# Patient Record
Sex: MALE | Race: WHITE | Employment: OTHER | URBAN - METROPOLITAN AREA
[De-identification: names, ages, dates, MRNs, and addresses within clinical notes are randomized per-mention and may not be internally consistent; named-entity substitution may affect disease eponyms.]

---

## 2017-04-12 ENCOUNTER — ALLSCRIPTS OFFICE VISIT (OUTPATIENT)
Dept: OTHER | Facility: OTHER | Age: 72
End: 2017-04-12

## 2017-04-12 DIAGNOSIS — N18.30 CHRONIC KIDNEY DISEASE, STAGE III (MODERATE) (HCC): ICD-10-CM

## 2017-10-16 ENCOUNTER — ALLSCRIPTS OFFICE VISIT (OUTPATIENT)
Dept: OTHER | Facility: OTHER | Age: 72
End: 2017-10-16

## 2017-10-16 DIAGNOSIS — N18.30 CHRONIC KIDNEY DISEASE, STAGE III (MODERATE) (HCC): ICD-10-CM

## 2017-10-17 NOTE — PROGRESS NOTES
Assessment  1  Stage III chronic kidney disease (585 3) (N18 3)   2  Diabetes mellitus type II, controlled (250 00) (E11 9)   3  CAD (coronary artery disease) (414 00) (I25 10)    Plan  Stage III chronic kidney disease    · (1) BASIC METABOLIC PROFILE; Status:Active; Requested [facility]:[id number];    Perform:[facility]; Due:16Oct2018; Ordered; For:Stage III chronic kidney disease; Ordered By:Mark Vela;   · (1) CBC/ PLT (NO DIFF); Status:Active; Requested UBD:[id number];    Perform:[facility]; Due:16Oct2018; Ordered; For:Stage III chronic kidney disease; Ordered By:Mark Vela;   · (1) PHOSPHORUS; Status:Active; Requested CUP:[id number];    Perform:[facility]; Due:16Oct2018; Ordered; For:Stage III chronic kidney disease; Ordered By:Mark Vela;   · (1) PTH N-TERMINAL (INTACT); Status:Active; Requested ZXE:[id number];    Perform:[facility]; Due:16Oct2018; Ordered; For:Stage III chronic kidney disease; Ordered By:Mark Vela;   · (1) URINALYSIS (will reflex a microscopy if leukocytes, occult blood, protein or nitrites are  not within normal limits); Status:Active; Requested QOX:[id number];    Perform:[facility]; Due:16Oct2018; Ordered; For:Stage III chronic kidney disease; Ordered By:Mark Vela;   · (1) URINE PROTEIN CREATININE RATIO; Status:Active; Requested OVU:[id number];    Perform:[facility]; Due:16Oct2018; Ordered; For:Stage III chronic kidney disease; Ordered By:Mark Vela;   · Follow-up visit in 6 months Evaluation and Treatment  Follow-up  Status: Complete -  Scheduling  Done: [id number] 11:16AM   Ordered; For: Stage III chronic kidney disease; Ordered By: Alley Lamb Performed:  Due: [id number]; Last Updated By: Cara Sparrow; 10/16/2017 11:16:06 AM    Discussion/Summary    CKD stage III: Stable  Due to diabetic nephropathy  type 2: Not very well control  we will see him back in 6 month     The patient was counseled regarding diagnostic results,-- instructions for management,-- risk factor reductions,-- prognosis  The patient has the current Goals: Keep kidney function stable  The patent has the current Barriers: None  Patient is able to Self-Care  Possible side effects of new medications were reviewed with the patient/guardian today  Indication for Services: CKD Stage 3  CKD Teaching includes Avoid nephrotoxic medication, dietician counseling and fluid management  Reason For Visit  Normal him in today for follow-up of stage III CKD because of diabetic nephropathy      History of Present Illness  He is doing quite well denies any complaint  Taking medicine regularly  His sugar is not well controlled and he is aware of it      Review of Systems    Constitutional: No complaints of fever, no chills, no anorexia, no tiredness, no recent weight gain or weight loss  Integumentary: No complaints of skin rash  Gastrointestinal: No complains of abdominal pain, no constipation or diarrhea, no nausea or vomiting  Respiratory: No complaints of shortness of breath, no cough, no productive sputum  Cardiovascular: No complaints of orthopnea, no PND, no chest pain, no palpitations, no lower extremity edema  Musculoskeletal: No complaints of joint pain or swelling  Neurological: No complaints of headache, no lightheadedness or dizziness  Genitourinary: No dysuria, no hematuria, no nocturia, no urinary frequency, no incomplete emptying of bladder, no foamy urine  ROS reviewed  Past Medical History    The active problems and past medical history were reviewed and updated today  Current Meds   1  Allopurinol 300 MG Oral Tablet; TAKE 1 TABLET DAILY; Therapy: (Recorded:07Nov2016) to Recorded   2  Aspirin 325 MG Oral Tablet; Take 1 tablet twice daily; Therapy: (Recorded:12Apr2017) to Recorded   3  Dicyclomine HCl - 20 MG Oral Tablet; Take 1 tablet daily; Therapy: (Recorded:12Apr2017) to Recorded   4   Fish Oil 1000 MG Oral Capsule; TAKE 6 CAPSULE Daily; Therapy: (Recorded:12Apr2017) to Recorded   5  Glimepiride 1 MG Oral Tablet; TAKE 1 TABLET 3 times daily; Therapy: (Recorded:12Apr2017) to Recorded   6  Isosorbide Mononitrate 10 MG Oral Tablet; Take 1 tablet daily; Therapy: (Recorded:07Nov2016) to Recorded   7  Januvia 50 MG Oral Tablet; Therapy: [id number] to Recorded   8  Lisinopril 5 MG Oral Tablet; Take 1 tablet daily; Therapy: (Recorded:12Apr2017) to Recorded   9  Metoprolol Tartrate 50 MG Oral Tablet; Take 1 tablet twice daily; Therapy: (Recorded:07Nov2016) to Recorded   10  Vitamin B-6 100 MG Oral Tablet; Take 1 tablet daily; Therapy: (Recorded:12Apr2017) to Recorded   11  Zantac CAPS; take 1 capsule daily; Therapy: (Recorded:12Apr2017) to Recorded    The medication list was reviewed and updated today  Allergies  1  No Known Drug Allergies    Vitals  Vital Signs    Recorded: [id number] 11:09AM Recorded: 27LXZ0475 10:44AM   Temperature  97 8 F   Heart Rate 80, Apical    Pulse Quality Normal, Apical    Respiration Quality Normal    Respiration 16    Systolic 701, LUE, Sitting    Diastolic 70, LUE, Sitting    Weight  212 lb    BMI Calculated  28 75   BSA Calculated  2 18     Physical Exam    Constitutional: General appearance: No acute distress, well appearing and well nourished  ENT: External ears and nose appear normal      Eyes: Anicteric sclerae  Neck: No bruit heard over either carotid  JVD:  No JVD present  Pulmonary: Respiratory effort: No increased work of breathing or signs of respiratory distress  -- Auscultation of lungs: Clear to auscultation  Cardiovascular: Auscultation of heart: Normal rate and rhythm, normal S1 and S2, without murmurs  Abdomen: Non-tender, no masses  Extremities: No cyanosis, clubbing or edema     Neurologic: Non Focal      Psychiatric: Orientation to person, place, and time: Normal        Results/Data  Nephrology Flowsheet [id number] 11:34AM Alley Hyatt Name Result Flag Reference   WBC, Urine 3       Nephrology Flowsheet [id number] 10:11AM Ely Epps     Test Name Result Flag Reference   WBC 7 3     Hemoglobin 15 9     Hematocrit 47 0     Platelets 895     Sodium 138     Potassium 4 1     Chloride 101     Carbon Dioxide 26     Calcium 10 0     GLUCOSE 169 H    BUN 25     Serum Creatinine 1 68 H    GFR, NON-AFRICAN AMERICAN 43     Phosphorus 3 3     PTH 50     Urine Protein Creatinine Ratio 0 12 H    Urinalysis Date 10/11/2017     SPECIFIC GRAVITY UA 1 024     BLOOD UA neg     PH UA 5 0     PROTEIN UA 1+     NITRITE UA neg     LEUKOCYTE ESTERASE UA trac     WBC, Urine 0     RBC 1     SQUAMOUS EPITHELIAL CELLS occasional     BACTERIA none     Glucose, Urine 1+         Future Appointments    Date/Time Provider Specialty Site   04/19/2018 11:00 AM ELOISA Sosa   Nephrology [address]     Signatures   Electronically signed by : ELOISA Rangel ; Oct 16 2017 12:06PM EST                       (Author)

## 2018-01-13 VITALS
DIASTOLIC BLOOD PRESSURE: 70 MMHG | HEIGHT: 72 IN | HEART RATE: 80 BPM | RESPIRATION RATE: 16 BRPM | SYSTOLIC BLOOD PRESSURE: 120 MMHG | BODY MASS INDEX: 28.44 KG/M2 | TEMPERATURE: 97.4 F | WEIGHT: 210 LBS

## 2018-01-14 VITALS
HEART RATE: 80 BPM | RESPIRATION RATE: 16 BRPM | WEIGHT: 212 LBS | BODY MASS INDEX: 28.75 KG/M2 | DIASTOLIC BLOOD PRESSURE: 70 MMHG | SYSTOLIC BLOOD PRESSURE: 110 MMHG | TEMPERATURE: 97.8 F

## 2018-04-30 RX ORDER — ISOSORBIDE MONONITRATE 10 MG/1
1 TABLET ORAL DAILY
COMMUNITY

## 2018-04-30 RX ORDER — ALLOPURINOL 300 MG/1
1 TABLET ORAL DAILY
COMMUNITY

## 2018-04-30 RX ORDER — METOPROLOL TARTRATE 50 MG/1
1 TABLET, FILM COATED ORAL 2 TIMES DAILY
COMMUNITY

## 2018-04-30 RX ORDER — FENOFIBRATE 48 MG/1
48 TABLET, COATED ORAL DAILY
Refills: 3 | COMMUNITY
Start: 2018-03-25

## 2018-04-30 RX ORDER — MULTIVITAMIN WITH IRON
1 TABLET ORAL DAILY
COMMUNITY

## 2018-04-30 RX ORDER — DICYCLOMINE HCL 20 MG
1 TABLET ORAL DAILY
COMMUNITY

## 2018-04-30 RX ORDER — GLIMEPIRIDE 1 MG/1
1 TABLET ORAL 3 TIMES DAILY
COMMUNITY

## 2018-04-30 RX ORDER — RANITIDINE 150 MG/1
1 CAPSULE ORAL DAILY
COMMUNITY

## 2018-04-30 RX ORDER — ASPIRIN 325 MG
1 TABLET ORAL 2 TIMES DAILY
COMMUNITY

## 2018-04-30 RX ORDER — LOSARTAN POTASSIUM 25 MG/1
25 TABLET ORAL DAILY
Refills: 3 | COMMUNITY
Start: 2018-03-15

## 2018-04-30 RX ORDER — LISINOPRIL 5 MG/1
1 TABLET ORAL DAILY
COMMUNITY

## 2018-04-30 RX ORDER — CHLORAL HYDRATE 500 MG
6 CAPSULE ORAL DAILY
COMMUNITY

## 2018-04-30 RX ORDER — REPAGLINIDE 1 MG/1
TABLET ORAL
Refills: 5 | COMMUNITY
Start: 2018-02-26

## 2018-04-30 RX ORDER — LEVOTHYROXINE SODIUM 0.03 MG/1
25 TABLET ORAL DAILY
Refills: 3 | COMMUNITY
Start: 2018-03-15

## 2018-04-30 RX ORDER — ATORVASTATIN CALCIUM 20 MG/1
20 TABLET, FILM COATED ORAL DAILY
Refills: 3 | COMMUNITY
Start: 2018-03-15

## 2018-05-01 LAB
ALT SERPL W P-5'-P-CCNC: 69 U/L (ref 12–78)
BUN SERPL-MCNC: 21 MG/DL (ref 5–25)
CHOLEST SERPL-MCNC: 185 MG/DL (ref 50–200)
CREAT ?TM UR-SCNC: 325 UMOL/L
CREAT SERPL-MCNC: 1.66 MG/DL (ref 0.6–1.3)
CREAT SERPL-MCNC: 1.76 MG/DL (ref 0.6–1.3)
EST. AVERAGE GLUCOSE BLD GHB EST-MCNC: 269 MG/DL
EXT BLOOD, UA: ABNORMAL
EXT GLUCOSE BLD: 220
EXT GLUCOSE BLD: 232
EXT GLUCOSE, UA: ABNORMAL
EXT KETONES: ABNORMAL
EXT NITRITE, UA: ABNORMAL
EXT PH, UA: 5
EXT PROTEIN URINE: 52.8
EXT PROTEIN, UA: ABNORMAL
EXT SPECIFIC GRAVITY, UA: 1.03
EXTERNAL ALBUMIN: 4.1
EXTERNAL ALK PHOS: 90
EXTERNAL ANION GAP: 9
EXTERNAL ANION GAP: 9
EXTERNAL AST: 51
EXTERNAL BICARBONATE: 26
EXTERNAL BUN: 22
EXTERNAL CALCIUM: 9.5
EXTERNAL CALCIUM: 9.6
EXTERNAL CHLORIDE: 102
EXTERNAL CHLORIDE: 102
EXTERNAL CO2: 26
EXTERNAL EGFR: 37
EXTERNAL EGFR: 40
EXTERNAL POTASSIUM: 4.4
EXTERNAL POTASSIUM: 4.5
EXTERNAL PTH: 81.9
EXTERNAL SODIUM: 137
EXTERNAL SODIUM: 137
EXTERNAL T.BILIRUBIN: 0.7
EXTERNAL TOTAL PROTEIN: 7.5
HBA1C MFR BLD: 11 % (ref 4.2–6.3)
HCT VFR BLD AUTO: 47.3 % (ref 36.5–49.3)
HCT VFR BLD AUTO: 48.2 % (ref 36.5–49.3)
HDLC SERPL-MCNC: 44 MG/DL (ref 40–60)
HGB BLD-MCNC: 16 G/DL (ref 12–17)
HGB BLD-MCNC: 16.2 G/DL (ref 12–17)
LDLC SERPL CALC-MCNC: 63 MG/DL (ref 0–100)
MICROALBUMIN UR-MCNC: 10.7 MG/L (ref 0–20)
PLATELET # BLD AUTO: 164 THOUSANDS/UL (ref 149–390)
PLATELET # BLD AUTO: 194 THOUSANDS/UL (ref 149–390)
PROT/CREAT UR: 0.16 MG/G{CREAT}
T4 FREE SERPL-MCNC: 0.96 NG/DL (ref 0.76–1.46)
TRIGL SERPL-MCNC: 392 MG/DL (ref ?–150)
TSH SERPL DL<=0.005 MIU/L-ACNC: 3.54 M[IU]/L
WBC # BLD AUTO: 7 THOUSAND/UL
WBC # BLD AUTO: 7.1 THOUSAND/UL
WBC # BLD EST: ABNORMAL 10*3/UL

## 2018-05-03 ENCOUNTER — OFFICE VISIT (OUTPATIENT)
Dept: NEPHROLOGY | Facility: CLINIC | Age: 73
End: 2018-05-03
Payer: MEDICARE

## 2018-05-03 VITALS — WEIGHT: 208 LBS | BODY MASS INDEX: 28.17 KG/M2 | HEIGHT: 72 IN | TEMPERATURE: 97.5 F

## 2018-05-03 DIAGNOSIS — I25.10 CORONARY ARTERY DISEASE INVOLVING NATIVE CORONARY ARTERY OF NATIVE HEART WITHOUT ANGINA PECTORIS: ICD-10-CM

## 2018-05-03 DIAGNOSIS — N18.30 STAGE III CHRONIC KIDNEY DISEASE (HCC): ICD-10-CM

## 2018-05-03 DIAGNOSIS — I10 ESSENTIAL HYPERTENSION: ICD-10-CM

## 2018-05-03 DIAGNOSIS — E11.21 TYPE 2 DIABETES MELLITUS WITH DIABETIC NEPHROPATHY, WITHOUT LONG-TERM CURRENT USE OF INSULIN (HCC): Primary | ICD-10-CM

## 2018-05-03 PROBLEM — E11.9 DM (DIABETES MELLITUS) (HCC): Status: ACTIVE | Noted: 2017-11-19

## 2018-05-03 PROCEDURE — 99213 OFFICE O/P EST LOW 20 MIN: CPT | Performed by: INTERNAL MEDICINE

## 2018-05-03 RX ORDER — LEVOCETIRIZINE DIHYDROCHLORIDE 5 MG/1
5 TABLET, FILM COATED ORAL
COMMUNITY
Start: 2016-02-23

## 2018-05-03 NOTE — ASSESSMENT & PLAN NOTE
Not very well controlled at told with hemoglobin A1cs almost well    He is going to see endocrinologist   I discussed uncontrolled diabetes any effect on kidney

## 2018-05-03 NOTE — ASSESSMENT & PLAN NOTE
His kidney function is stable at GFR of 40  Etiology is diabetic nephropathy    I discussed asymptomatic and progressive nature of kidney disease with him and advised to avoid any nephrotoxic medicine

## 2018-05-03 NOTE — LETTER
May 3, 2018     Bienvenido Tony DO  [address], [address] [facility] [address]    Patient: Jake Gonzalez   YOB: 1945   Date of Visit: 5/3/2018       Dear Dr Arevalo Levels: Thank you for referring Brittany Luciano to me for evaluation  Below are my notes for this consultation  If you have questions, please do not hesitate to call me  I look forward to following your patient along with you  Sincerely,        Tessa Beasley MD        CC: No Recipients  Tessa Beasley MD  5/3/2018 11:58 AM  Sign at close encounter  [address] [place] 75 y o  male MRN: [id number]    Encounter: [id number] 5/3/2018    REASON FOR VISIT: Jake Gonzalez is a 75 y o  male who is here on 5/3/2018 for CKD III; Follow-up; Hypertension; Hyperlipidemia; and Diabetes    HPI:    Patrice Meckel came in today for follow-up of stage III CKD  He is overall doing well denies any complaint  His diabetes is not well controlled and he is going to see endocrinologist   He is drinking enough liquid and avoiding any nonsteroidal pain killer        REVIEW OF SYSTEMS:    Review of Systems   Constitutional: Negative for activity change and fatigue  HENT: Negative for congestion and ear discharge  Eyes: Negative for photophobia and pain  Respiratory: Negative for apnea and choking  Cardiovascular: Negative for chest pain and palpitations  Gastrointestinal: Negative for abdominal distention and blood in stool  Endocrine: Negative for heat intolerance and polyphagia  Genitourinary: Negative for flank pain and urgency  Musculoskeletal: Negative for neck pain and neck stiffness  Skin: Negative for color change and wound  Allergic/Immunologic: Negative for food allergies and immunocompromised state  Neurological: Negative for seizures and facial asymmetry  Hematological: Negative for adenopathy  Does not bruise/bleed easily     Psychiatric/Behavioral: Negative for self-injury and suicidal ideas          PAST MEDICAL HISTORY:  Past Medical History:   Diagnosis Date    Chronic kidney disease     Coronary artery disease     Diabetes mellitus ([address] )     High cholesterol     Hypertension        PAST SURGICAL HISTORY:  Past Surgical History:   Procedure Laterality Date    CARDIAC SURGERY      CORONARY ARTERY BYPASS GRAFT      GALLBLADDER SURGERY      SHOULDER SURGERY      VESICOSTOMY         SOCIAL HISTORY:  History   Alcohol Use No     History   Drug use: Unknown     History   Smoking Status    Former Smoker   Smokeless Tobacco    Never Used       FAMILY HISTORY:  Family History   Problem Relation Age of Onset    Hypertension Mother     Diabetes Mother     Hypertension Father     Heart disease Father        MEDICATIONS:    Current Outpatient Prescriptions:     allopurinol (ZYLOPRIM) 300 mg tablet, Take 1 tablet by mouth daily, Disp: , Rfl:     APPLE CIDER VINEGAR PO, Take 350 mg by mouth 2 (two) times a day, Disp: , Rfl:     aspirin 325 mg tablet, Take 1 tablet by mouth 2 (two) times a day, Disp: , Rfl:     atorvastatin (LIPITOR) 20 mg tablet, Take 20 mg by mouth daily, Disp: , Rfl: 3    fenofibrate (TRICOR) 48 mg tablet, Take 48 mg by mouth daily, Disp: , Rfl: 3    glimepiride (AMARYL) 1 mg tablet, Take 1 tablet by mouth 3 (three) times a day, Disp: , Rfl:     isosorbide mononitrate (ISMO,MONOKET) 10 MG tablet, Take 1 tablet by mouth daily, Disp: , Rfl:     levocetirizine (XYZAL) 5 MG tablet, Take 5 mg by mouth, Disp: , Rfl:     levothyroxine 25 mcg tablet, Take 25 mcg by mouth daily, Disp: , Rfl: 3    losartan (COZAAR) 25 mg tablet, Take 25 mg by mouth daily, Disp: , Rfl: 3    metoprolol tartrate (LOPRESSOR) 50 mg tablet, Take 1 tablet by mouth 2 (two) times a day, Disp: , Rfl:     Omega-3 Fatty Acids (FISH OIL) 1,000 mg, Take 6 capsules by mouth daily, Disp: , Rfl:     pyridoxine (VITAMIN B6) 100 mg tablet, Take 1 tablet by mouth daily, Disp: , Rfl:     ranitidine (ZANTAC) 150 MG capsule, Take 1 capsule by mouth daily, Disp: , Rfl:     repaglinide (PRANDIN) 1 mg tablet, TAKE 1 TABLET BY MOUTH 3 TIMES A DAY (BEFORE MEALS), Disp: , Rfl: 5    dicyclomine (BENTYL) 20 mg tablet, Take 1 tablet by mouth daily, Disp: , Rfl:     lisinopril (ZESTRIL) 5 mg tablet, Take 1 tablet by mouth daily, Disp: , Rfl:     sitaGLIPtin (JANUVIA) 50 mg tablet, Take by mouth, Disp: , Rfl:     PHYSICAL EXAM:  Vitals:    05/03/18 0849   Temp: 97 5 °F (36 4 °C)   TempSrc: Oral   Weight: 94 3 kg (208 lb)   Height: 6' (1 829 m)     Body mass index is 28 21 kg/m²  Physical Exam   Constitutional: He is oriented to person, place, and time  He appears well-developed  No distress  HENT:   Head: Normocephalic  Mouth/Throat: Oropharynx is clear and moist    Eyes: Conjunctivae are normal  No scleral icterus  Neck: Neck supple  No JVD present  Cardiovascular: Normal rate and normal heart sounds  Pulmonary/Chest: Effort normal  He has no wheezes  Abdominal: Soft  There is no tenderness  Musculoskeletal: Normal range of motion  He exhibits no edema  Neurological: He is alert and oriented to person, place, and time  Skin: Skin is warm  No rash noted  Psychiatric: He has a normal mood and affect  His behavior is normal        LAB RESULTS:  Results for orders placed or performed in visit on 05/01/18   Comprehensive metabolic panel   Result Value Ref Range    EXTERNAL SODIUM 137     EXTERNAL POTASSIUM 4 4     EXTERNAL CHLORIDE 102     EXTERNAL BICARB 26     EXTERNAL ANION GAP 9     BUN 21 5 - 25 mg/dL    Creatinine 1 66 (A) 0 60 - 1 30 mg/dL    EXT Glucose Bld 232     EXTERNAL CALCIUM 9 5     EXTERNAL TOTAL PROTEIN 7 5     EXTERNAL ALBUMIN 4 1     EXTERNAL AST 51     ALT 69 12 - 78 U/L    EXTERNAL ALK PHOS 90     EXTERNAL TOT   BILIRUBIN 0 7     EXTERNAL EGFR 40    T4, free   Result Value Ref Range    Free T4 0 96 0 76 - 1 46 ng/dL   Hemoglobin A1C With EAG (Historical)   Result Value Ref Range Hemoglobin A1C 11 0 (A) 4 2 - 6 3 %     mg/dl   Lipid panel   Result Value Ref Range    Cholesterol 185 50 - 200 mg/dL    Triglycerides 392 (A) 150 mg/dL    Total HDL  Direct 44 40 - 60 mg/dL    LDL Calculated 63 0 - 100 mg/dL   Microalbumin,Urine   Result Value Ref Range    Microalb, Ur 10 7 0 - 20 mg/L   Urinalysis with microscopic   Result Value Ref Range    EXT Spec Grav, UA 1 028     EXT Glucose, UA (Ref: Negative) 150-499     EXT Ketones, UA (Ref: Negative) NEG     EXT Blood, UA NEG     EXT Nitrite, UA (Ref: Negative) NEG     EXT Leukocytes, UA (Ref: Negative) NEG     EXT pH, UA 5 0     EXT Protein, UA (Ref: Negative) 30-70    CBC   Result Value Ref Range    WBC 7 00 Thousand/uL    Hemoglobin 16 0 12 0 - 17 0 g/dL    Hematocrit 47 3 36 5 - 49 3 %    Platelets 440 375 - 787 Thousands/uL   Basic metabolic panel   Result Value Ref Range    EXTERNAL SODIUM 137     EXTERNAL POTASSIUM 4 5     EXTERNAL CHLORIDE 102     EXTERNAL CO2 26     EXTERNAL BUN 22     Creatinine 1 76 (A) 0 60 - 1 30 mg/dL    EXT Glucose Bld 220     EXTERNAL CALCIUM 9 6     EXTERNAL ANION GAP 9     EXTERNAL EGFR 37    Protein / creatinine ratio, urine   Result Value Ref Range    EXTERNAL PROTEIN URINE 52 8     EXT Creatinine Urine 325 0     EXTERNAL Ur Prot/Creat Ratio 0 16    PTH, intact   Result Value Ref Range    EXTERNAL PTH 81 9    CBC   Result Value Ref Range    WBC 7 10 Thousand/uL    Hemoglobin 16 2 12 0 - 17 0 g/dL    Hematocrit 48 2 36 5 - 49 3 %    Platelets 533 063 - 183 Thousands/uL   TSH Stimulation   Result Value Ref Range    TSH 3 54        ASSESSMENT and PLAN:      Stage III chronic kidney disease  His kidney function is stable at GFR of 40  Etiology is diabetic nephropathy  I discussed asymptomatic and progressive nature of kidney disease with him and advised to avoid any nephrotoxic medicine    DM (diabetes mellitus) ([facility] [address] )  Not very well controlled at told with hemoglobin A1cs almost well    He is going to see endocrinologist   I discussed uncontrolled diabetes any effect on kidney    Hypertension  Very well control        I will see him back in 6 months      Portions of the record may have been created with voice recognition software  Occasional wrong word or "sound a like" substitutions may have occurred due to the inherent limitations of voice recognition software  Read the chart carefully and recognize, using context, where substitutions have occurred  If you have any questions, please contact the dictating provider

## 2018-05-03 NOTE — PATIENT INSTRUCTIONS
Chronic Kidney Disease   AMBULATORY CARE:   Chronic kidney disease (CKD)  is the gradual and permanent loss of kidney function  Normally, the kidneys remove fluid, chemicals, and waste from your blood  These wastes are turned into urine by your kidneys  CKD may worsen over time and lead to kidney failure  Common symptoms include the following:   · Changes in how often you need to urinate    · Swelling in your arms, legs, or feet    · Shortness of breath    · Fatigue or weakness    · Bad or bitter taste in your mouth    · Nausea, vomiting, or loss of appetite  Seek care immediately if:   · You are confused and very drowsy  · You have a seizure  · You have shortness of breath  Contact your healthcare provider if:   · You suddenly gain or lose more weight than your healthcare provider has told you is okay  · You have itchy skin or a rash  · You urinate more or less than you normally do  · You have blood in your urine  · You have nausea and repeated vomiting  · You have fatigue or muscle weakness  · You have hiccups that will not stop  · You have questions or concerns about your condition or care  Treatment for CKD:  Medicines may be given to decrease blood pressure and get rid of extra fluid  You may also receive medicine to manage health conditions that may occur with CKD  Dialysis is a treatment to remove chemicals and waste from your blood when your kidneys can no longer do this  Surgery may be needed to create an arteriovenous fistula (AVF) in your arm or insert a catheter into your abdomen so that you can receive dialysis  A kidney transplant may be done if your CKD becomes severe  Manage CKD:   · Maintain a healthy weight  Ask your healthcare provider how much you should weigh  Ask him to help you create a weight loss plan if you are overweight  · Exercise 30 to 60 minutes a day, 4 to 7 times a week, or as directed  Ask about the best exercise plan for you   Regular exercise can help you manage CKD, high blood pressure, and diabetes  · Follow your healthcare provider's advice about what to eat and drink  He may tell you to eat food low in sodium (salt), potassium, phosphorus, or protein  You may need to see a dietitian if you need help planning meals  Ask how much liquid to drink each day and which liquids are best for you  · Limit alcohol  Ask how much alcohol is safe for you to drink  A drink of alcohol is 12 ounces of beer, 5 ounces of wine, or 1½ ounces of liquor  · Do not smoke  Nicotine and other chemicals in cigarettes and cigars can cause lung and kidney damage  Ask your healthcare provider for information if you currently smoke and need help to quit  E-cigarettes or smokeless tobacco still contain nicotine  Talk to your healthcare provider before you use these products  · Ask your healthcare provider if you need vaccines  Infections such as pneumonia, influenza, and hepatitis can be more harmful or more likely to occur in a person who has CKD  Vaccines reduce your risk of infection with these viruses  Follow up with your healthcare provider as directed:  Write down your questions so you remember to ask them during your visits  © 2017 [facility] Information is for End User's use only and may not be sold, redistributed or otherwise used for commercial purposes  All illustrations and images included in CareNotes® are the copyrighted property of [employer] D [employer] M [employer]  or [employer]  The above information is an  only  It is not intended as medical advice for individual conditions or treatments  Talk to your doctor, nurse or pharmacist before following any medical regimen to see if it is safe and effective for you

## 2018-05-03 NOTE — PROGRESS NOTES
NEPHROLOGY OFFICE FOLLOW UP  Sam Matias 75 y o  male MRN: [id number]    Encounter: [id number] 5/3/2018    REASON FOR VISIT: Tanvi Farias is a 75 y o  male who is here on 5/3/2018 for CKD III; Follow-up; Hypertension; Hyperlipidemia; and Diabetes    HPI:    Nilda Felix came in today for follow-up of stage III CKD  He is overall doing well denies any complaint  His diabetes is not well controlled and he is going to see endocrinologist   He is drinking enough liquid and avoiding any nonsteroidal pain killer        REVIEW OF SYSTEMS:    Review of Systems   Constitutional: Negative for activity change and fatigue  HENT: Negative for congestion and ear discharge  Eyes: Negative for photophobia and pain  Respiratory: Negative for apnea and choking  Cardiovascular: Negative for chest pain and palpitations  Gastrointestinal: Negative for abdominal distention and blood in stool  Endocrine: Negative for heat intolerance and polyphagia  Genitourinary: Negative for flank pain and urgency  Musculoskeletal: Negative for neck pain and neck stiffness  Skin: Negative for color change and wound  Allergic/Immunologic: Negative for food allergies and immunocompromised state  Neurological: Negative for seizures and facial asymmetry  Hematological: Negative for adenopathy  Does not bruise/bleed easily  Psychiatric/Behavioral: Negative for self-injury and suicidal ideas           PAST MEDICAL HISTORY:  Past Medical History:   Diagnosis Date    Chronic kidney disease     Coronary artery disease     Diabetes mellitus ([facility] [address] [facility]     High cholesterol     Hypertension        PAST SURGICAL HISTORY:  Past Surgical History:   Procedure Laterality Date    CARDIAC SURGERY      CORONARY ARTERY BYPASS GRAFT      GALLBLADDER SURGERY      SHOULDER SURGERY      VESICOSTOMY         SOCIAL HISTORY:  History   Alcohol Use No     History   Drug use: Unknown     History   Smoking Status    Former Smoker   Smokeless Tobacco    Never Used       FAMILY HISTORY:  Family History   Problem Relation Age of Onset    Hypertension Mother     Diabetes Mother     Hypertension Father     Heart disease Father        MEDICATIONS:    Current Outpatient Prescriptions:     allopurinol (ZYLOPRIM) 300 mg tablet, Take 1 tablet by mouth daily, Disp: , Rfl:     APPLE CIDER VINEGAR PO, Take 350 mg by mouth 2 (two) times a day, Disp: , Rfl:     aspirin 325 mg tablet, Take 1 tablet by mouth 2 (two) times a day, Disp: , Rfl:     atorvastatin (LIPITOR) 20 mg tablet, Take 20 mg by mouth daily, Disp: , Rfl: 3    fenofibrate (TRICOR) 48 mg tablet, Take 48 mg by mouth daily, Disp: , Rfl: 3    glimepiride (AMARYL) 1 mg tablet, Take 1 tablet by mouth 3 (three) times a day, Disp: , Rfl:     isosorbide mononitrate (ISMO,MONOKET) 10 MG tablet, Take 1 tablet by mouth daily, Disp: , Rfl:     levocetirizine (XYZAL) 5 MG tablet, Take 5 mg by mouth, Disp: , Rfl:     levothyroxine 25 mcg tablet, Take 25 mcg by mouth daily, Disp: , Rfl: 3    losartan (COZAAR) 25 mg tablet, Take 25 mg by mouth daily, Disp: , Rfl: 3    metoprolol tartrate (LOPRESSOR) 50 mg tablet, Take 1 tablet by mouth 2 (two) times a day, Disp: , Rfl:     Omega-3 Fatty Acids (FISH OIL) 1,000 mg, Take 6 capsules by mouth daily, Disp: , Rfl:     pyridoxine (VITAMIN B6) 100 mg tablet, Take 1 tablet by mouth daily, Disp: , Rfl:     ranitidine (ZANTAC) 150 MG capsule, Take 1 capsule by mouth daily, Disp: , Rfl:     repaglinide (PRANDIN) 1 mg tablet, TAKE 1 TABLET BY MOUTH 3 TIMES A DAY (BEFORE MEALS), Disp: , Rfl: 5    dicyclomine (BENTYL) 20 mg tablet, Take 1 tablet by mouth daily, Disp: , Rfl:     lisinopril (ZESTRIL) 5 mg tablet, Take 1 tablet by mouth daily, Disp: , Rfl:     sitaGLIPtin (JANUVIA) 50 mg tablet, Take by mouth, Disp: , Rfl:     PHYSICAL EXAM:  Vitals:    05/03/18 0849   Temp: 97 5 °F (36 4 °C)   TempSrc: Oral   Weight: 94 3 kg (208 lb)   Height: 6' (1 829 m)     Body mass index is 28 21 kg/m²  Physical Exam   Constitutional: He is oriented to person, place, and time  He appears well-developed  No distress  HENT:   Head: Normocephalic  Mouth/Throat: Oropharynx is clear and moist    Eyes: Conjunctivae are normal  No scleral icterus  Neck: Neck supple  No JVD present  Cardiovascular: Normal rate and normal heart sounds  Pulmonary/Chest: Effort normal  He has no wheezes  Abdominal: Soft  There is no tenderness  Musculoskeletal: Normal range of motion  He exhibits no edema  Neurological: He is alert and oriented to person, place, and time  Skin: Skin is warm  No rash noted  Psychiatric: He has a normal mood and affect  His behavior is normal        LAB RESULTS:  Results for orders placed or performed in visit on 05/01/18   Comprehensive metabolic panel   Result Value Ref Range    EXTERNAL SODIUM 137     EXTERNAL POTASSIUM 4 4     EXTERNAL CHLORIDE 102     EXTERNAL BICARB 26     EXTERNAL ANION GAP 9     BUN 21 5 - 25 mg/dL    Creatinine 1 66 (A) 0 60 - 1 30 mg/dL    EXT Glucose Bld 232     EXTERNAL CALCIUM 9 5     EXTERNAL TOTAL PROTEIN 7 5     EXTERNAL ALBUMIN 4 1     EXTERNAL AST 51     ALT 69 12 - 78 U/L    EXTERNAL ALK PHOS 90     EXTERNAL TOT   BILIRUBIN 0 7     EXTERNAL EGFR 40    T4, free   Result Value Ref Range    Free T4 0 96 0 76 - 1 46 ng/dL   Hemoglobin A1C With EAG (Historical)   Result Value Ref Range    Hemoglobin A1C 11 0 (A) 4 2 - 6 3 %     mg/dl   Lipid panel   Result Value Ref Range    Cholesterol 185 50 - 200 mg/dL    Triglycerides 392 (A) 150 mg/dL    Total HDL  Direct 44 40 - 60 mg/dL    LDL Calculated 63 0 - 100 mg/dL   Microalbumin,Urine   Result Value Ref Range    Microalb, Ur 10 7 0 - 20 mg/L   Urinalysis with microscopic   Result Value Ref Range    EXT Spec Grav, UA 1 028     EXT Glucose, UA (Ref: Negative) 150-499     EXT Ketones, UA (Ref: Negative) NEG     EXT Blood, UA NEG     EXT Nitrite, UA (Ref: Negative) NEG     EXT Leukocytes, UA (Ref: Negative) NEG     EXT pH, UA 5 0     EXT Protein, UA (Ref: Negative) 30-70    CBC   Result Value Ref Range    WBC 7 00 Thousand/uL    Hemoglobin 16 0 12 0 - 17 0 g/dL    Hematocrit 47 3 36 5 - 49 3 %    Platelets 615 198 - 200 Thousands/uL   Basic metabolic panel   Result Value Ref Range    EXTERNAL SODIUM 137     EXTERNAL POTASSIUM 4 5     EXTERNAL CHLORIDE 102     EXTERNAL CO2 26     EXTERNAL BUN 22     Creatinine 1 76 (A) 0 60 - 1 30 mg/dL    EXT Glucose Bld 220     EXTERNAL CALCIUM 9 6     EXTERNAL ANION GAP 9     EXTERNAL EGFR 37    Protein / creatinine ratio, urine   Result Value Ref Range    EXTERNAL PROTEIN URINE 52 8     EXT Creatinine Urine 325 0     EXTERNAL Ur Prot/Creat Ratio 0 16    PTH, intact   Result Value Ref Range    EXTERNAL PTH 81 9    CBC   Result Value Ref Range    WBC 7 10 Thousand/uL    Hemoglobin 16 2 12 0 - 17 0 g/dL    Hematocrit 48 2 36 5 - 49 3 %    Platelets 653 742 - 092 Thousands/uL   TSH Stimulation   Result Value Ref Range    TSH 3 54        ASSESSMENT and PLAN:      Stage III chronic kidney disease  His kidney function is stable at GFR of 40  Etiology is diabetic nephropathy  I discussed asymptomatic and progressive nature of kidney disease with him and advised to avoid any nephrotoxic medicine    DM (diabetes mellitus) ([facility] [address] )  Not very well controlled at told with hemoglobin A1cs almost well  He is going to see endocrinologist   I discussed uncontrolled diabetes any effect on kidney    Hypertension  Very well control        I will see him back in 6 months      Portions of the record may have been created with voice recognition software  Occasional wrong word or "sound a like" substitutions may have occurred due to the inherent limitations of voice recognition software  Read the chart carefully and recognize, using context, where substitutions have occurred  If you have any questions, please contact the dictating provider

## 2018-10-03 LAB
CREAT ?TM UR-SCNC: 191 UMOL/L
CREAT SERPL-MCNC: 1.59 MG/DL (ref 0.6–1.3)
EXT BLOOD, UA: ABNORMAL
EXT GLUCOSE BLD: 196
EXT GLUCOSE, UA: ABNORMAL
EXT KETONES: ABNORMAL
EXT NITRITE, UA: ABNORMAL
EXT PH, UA: 5
EXT PROTEIN URINE: 44.5
EXT PROTEIN, UA: ABNORMAL
EXT SPECIFIC GRAVITY, UA: 1.02
EXTERNAL ANION GAP: 12
EXTERNAL BACTERIA (UA): ABNORMAL
EXTERNAL BUN: 23
EXTERNAL CALCIUM: 9.3
EXTERNAL CASTS (UA): ABNORMAL
EXTERNAL CHLORIDE: 102
EXTERNAL CO2: 24
EXTERNAL EGFR: 42
EXTERNAL PHOSPHORUS: 3.1
EXTERNAL POTASSIUM: 4.6
EXTERNAL PTH: 57.8
EXTERNAL RBC (UA): ABNORMAL
EXTERNAL SODIUM: 138
EXTERNAL WBC (UA): ABNORMAL
HCT VFR BLD AUTO: 47.2 % (ref 36.5–49.3)
HGB BLD-MCNC: 16.3 G/DL (ref 12–17)
PLATELET # BLD AUTO: 209 THOUSANDS/UL (ref 149–390)
PROT/CREAT UR: 0.23 MG/G{CREAT}
WBC # BLD AUTO: 8.6 THOUSAND/UL
WBC # BLD EST: ABNORMAL 10*3/UL

## 2018-10-04 ENCOUNTER — OFFICE VISIT (OUTPATIENT)
Dept: NEPHROLOGY | Facility: CLINIC | Age: 73
End: 2018-10-04
Payer: MEDICARE

## 2018-10-04 VITALS
HEIGHT: 72 IN | TEMPERATURE: 96.6 F | WEIGHT: 200 LBS | SYSTOLIC BLOOD PRESSURE: 120 MMHG | RESPIRATION RATE: 16 BRPM | HEART RATE: 80 BPM | BODY MASS INDEX: 27.09 KG/M2 | DIASTOLIC BLOOD PRESSURE: 64 MMHG

## 2018-10-04 DIAGNOSIS — N18.30 STAGE III CHRONIC KIDNEY DISEASE (HCC): Primary | ICD-10-CM

## 2018-10-04 DIAGNOSIS — E11.21 TYPE 2 DIABETES MELLITUS WITH DIABETIC NEPHROPATHY, WITHOUT LONG-TERM CURRENT USE OF INSULIN (HCC): ICD-10-CM

## 2018-10-04 DIAGNOSIS — I10 ESSENTIAL HYPERTENSION: ICD-10-CM

## 2018-10-04 PROCEDURE — 99213 OFFICE O/P EST LOW 20 MIN: CPT | Performed by: INTERNAL MEDICINE

## 2018-10-04 NOTE — PATIENT INSTRUCTIONS
Chronic Kidney Disease   AMBULATORY CARE:   Chronic kidney disease (CKD)  is the gradual and permanent loss of kidney function  Normally, the kidneys remove fluid, chemicals, and waste from your blood  These wastes are turned into urine by your kidneys  CKD may worsen over time and lead to kidney failure  Common symptoms include the following:   · Changes in how often you need to urinate    · Swelling in your arms, legs, or feet    · Shortness of breath    · Fatigue or weakness    · Bad or bitter taste in your mouth    · Nausea, vomiting, or loss of appetite  Seek care immediately if:   · You are confused and very drowsy  · You have a seizure  · You have shortness of breath  Contact your healthcare provider if:   · You suddenly gain or lose more weight than your healthcare provider has told you is okay  · You have itchy skin or a rash  · You urinate more or less than you normally do  · You have blood in your urine  · You have nausea and repeated vomiting  · You have fatigue or muscle weakness  · You have hiccups that will not stop  · You have questions or concerns about your condition or care  Treatment for CKD:  Medicines may be given to decrease blood pressure and get rid of extra fluid  You may also receive medicine to manage health conditions that may occur with CKD  Dialysis is a treatment to remove chemicals and waste from your blood when your kidneys can no longer do this  Surgery may be needed to create an arteriovenous fistula (AVF) in your arm or insert a catheter into your abdomen so that you can receive dialysis  A kidney transplant may be done if your CKD becomes severe  Manage CKD:   · Maintain a healthy weight  Ask your healthcare provider how much you should weigh  Ask him to help you create a weight loss plan if you are overweight  · Exercise 30 to 60 minutes a day, 4 to 7 times a week, or as directed  Ask about the best exercise plan for you   Regular exercise can help you manage CKD, high blood pressure, and diabetes  · Follow your healthcare provider's advice about what to eat and drink  He may tell you to eat food low in sodium (salt), potassium, phosphorus, or protein  You may need to see a dietitian if you need help planning meals  Ask how much liquid to drink each day and which liquids are best for you  · Limit alcohol  Ask how much alcohol is safe for you to drink  A drink of alcohol is 12 ounces of beer, 5 ounces of wine, or 1½ ounces of liquor  · Do not smoke  Nicotine and other chemicals in cigarettes and cigars can cause lung and kidney damage  Ask your healthcare provider for information if you currently smoke and need help to quit  E-cigarettes or smokeless tobacco still contain nicotine  Talk to your healthcare provider before you use these products  · Ask your healthcare provider if you need vaccines  Infections such as pneumonia, influenza, and hepatitis can be more harmful or more likely to occur in a person who has CKD  Vaccines reduce your risk of infection with these viruses  Follow up with your healthcare provider as directed:  Write down your questions so you remember to ask them during your visits  © 2017 [address] Information is for End User's use only and may not be sold, redistributed or otherwise used for commercial purposes  All illustrations and images included in CareNotes® are the copyrighted property of [employer]  or [employer]  The above information is an  only  It is not intended as medical advice for individual conditions or treatments  Talk to your doctor, nurse or pharmacist before following any medical regimen to see if it is safe and effective for you

## 2018-10-04 NOTE — ASSESSMENT & PLAN NOTE
His kidney function is stable at GFR of 42  Likely etiology is diabetic nephropathy  He is aware of it    Advised hydration and avoidance of nephrotoxic medicine

## 2018-10-04 NOTE — ASSESSMENT & PLAN NOTE
Lab Results   Component Value Date    HGBA1C 11 0 (A) 04/12/2018       No results for input(s): POCGLU in the last 72 hours      Blood Sugar Average: Last 72 hrs:       Not very well controlled being monitored by endocrinologist   Importance of diabetic control and kidney disease also discussed with him

## 2018-10-04 NOTE — LETTER
October 4, 2018     [place], [address] [address]    Patient: Randolph Parks   YOB: 1945   Date of Visit: 10/4/2018       Dear Dr Lc Dalton: Thank you for referring Isaac Medico to me for evaluation  Below are my notes for this consultation  If you have questions, please do not hesitate to call me  I look forward to following your patient along with you  Sincerely,        Lizz Chavis MD        CC: No Recipients  Lizz Chavis MD  10/4/2018 12:26 PM  Sign at close encounter  [address] [place] Matias 75 y o  male MRN: [id number]    Encounter: [id number] 10/4/2018    REASON FOR VISIT: Randolph Parks is a 75 y o  male who is here on 10/4/2018 for Follow-up; Diabetes Type 2; and Chronic Kidney Disease    HPI:    Faith Crespo came in today for follow-up of CKD stage 3 because of diabetes  He is overall doing well  Denies any complaint frustrated with uncontrolled glucose  No other complaint        REVIEW OF SYSTEMS:    Review of Systems   Constitutional: Negative for activity change and fatigue  HENT: Negative for congestion, ear discharge, sinus pain and sinus pressure  Eyes: Negative for photophobia and pain  Respiratory: Negative for apnea, cough, choking, chest tightness and shortness of breath  Cardiovascular: Negative for chest pain, palpitations and leg swelling  Gastrointestinal: Negative for abdominal distention, abdominal pain, blood in stool and constipation  Endocrine: Negative for heat intolerance, polyphagia and polyuria  Genitourinary: Negative for difficulty urinating, flank pain and urgency  Musculoskeletal: Positive for arthralgias and back pain  Negative for neck pain and neck stiffness  Skin: Negative for color change and wound  Allergic/Immunologic: Negative for food allergies and immunocompromised state  Neurological: Negative for seizures and facial asymmetry     Hematological: Negative for adenopathy  Does not bruise/bleed easily  Psychiatric/Behavioral: Negative for self-injury and suicidal ideas           PAST MEDICAL HISTORY:  Past Medical History:   Diagnosis Date    Chronic kidney disease     Coronary artery disease     Diabetes mellitus ([address] )     High cholesterol     Hypertension        PAST SURGICAL HISTORY:  Past Surgical History:   Procedure Laterality Date    CARDIAC SURGERY      CORONARY ARTERY BYPASS GRAFT      GALLBLADDER SURGERY      SHOULDER SURGERY      VESICOSTOMY         SOCIAL HISTORY:  History   Alcohol Use No     History   Drug use: Unknown     History   Smoking Status    Former Smoker   Smokeless Tobacco    Never Used       FAMILY HISTORY:  Family History   Problem Relation Age of Onset    Hypertension Mother     Diabetes Mother     Hypertension Father     Heart disease Father        MEDICATIONS:    Current Outpatient Prescriptions:     allopurinol (ZYLOPRIM) 300 mg tablet, Take 1 tablet by mouth daily, Disp: , Rfl:     APPLE CIDER VINEGAR PO, Take 350 mg by mouth 2 (two) times a day, Disp: , Rfl:     aspirin 325 mg tablet, Take 1 tablet by mouth 2 (two) times a day, Disp: , Rfl:     atorvastatin (LIPITOR) 20 mg tablet, Take 20 mg by mouth daily, Disp: , Rfl: 3    dicyclomine (BENTYL) 20 mg tablet, Take 1 tablet by mouth daily, Disp: , Rfl:     fenofibrate (TRICOR) 48 mg tablet, Take 48 mg by mouth daily, Disp: , Rfl: 3    glimepiride (AMARYL) 1 mg tablet, Take 1 tablet by mouth 3 (three) times a day, Disp: , Rfl:     isosorbide mononitrate (ISMO,MONOKET) 10 MG tablet, Take 1 tablet by mouth daily, Disp: , Rfl:     levocetirizine (XYZAL) 5 MG tablet, Take 5 mg by mouth, Disp: , Rfl:     levothyroxine 25 mcg tablet, Take 25 mcg by mouth daily, Disp: , Rfl: 3    lisinopril (ZESTRIL) 5 mg tablet, Take 1 tablet by mouth daily, Disp: , Rfl:     losartan (COZAAR) 25 mg tablet, Take 25 mg by mouth daily, Disp: , Rfl: 3    metoprolol tartrate (LOPRESSOR) 50 mg tablet, Take 1 tablet by mouth 2 (two) times a day, Disp: , Rfl:     Omega-3 Fatty Acids (FISH OIL) 1,000 mg, Take 6 capsules by mouth daily, Disp: , Rfl:     pyridoxine (VITAMIN B6) 100 mg tablet, Take 1 tablet by mouth daily, Disp: , Rfl:     ranitidine (ZANTAC) 150 MG capsule, Take 1 capsule by mouth daily, Disp: , Rfl:     repaglinide (PRANDIN) 1 mg tablet, TAKE 1 TABLET BY MOUTH 3 TIMES A DAY (BEFORE MEALS), Disp: , Rfl: 5    sitaGLIPtin (JANUVIA) 50 mg tablet, Take by mouth, Disp: , Rfl:     PHYSICAL EXAM:  Vitals:    10/04/18 1012   BP: 120/64   BP Location: Right arm   Patient Position: Sitting   Pulse: 80   Resp: 16   Temp: (!) 96 6 °F (35 9 °C)   TempSrc: Tympanic   Weight: 90 7 kg (200 lb)   Height: 6' (1 829 m)     Body mass index is 27 12 kg/m²  Physical Exam   Constitutional: He is oriented to person, place, and time  He appears well-developed  No distress  HENT:   Head: Normocephalic and atraumatic  Mouth/Throat: Oropharynx is clear and moist    Eyes: Pupils are equal, round, and reactive to light  Conjunctivae are normal  No scleral icterus  Neck: Normal range of motion  Neck supple  No JVD present  Cardiovascular: Normal rate, regular rhythm and normal heart sounds  No murmur heard  Pulmonary/Chest: Effort normal and breath sounds normal  No respiratory distress  He has no wheezes  Abdominal: Soft  Bowel sounds are normal  He exhibits no distension and no mass  There is no tenderness  Musculoskeletal: Normal range of motion  He exhibits no edema  Neurological: He is alert and oriented to person, place, and time  Skin: Skin is warm  No rash noted  Psychiatric: He has a normal mood and affect   His behavior is normal        LAB RESULTS:  Results for orders placed or performed in visit on 83/06/13   Basic metabolic panel   Result Value Ref Range    SODIUM 138     POTASSIUM 4 6     CHLORIDE 102     CO2 24     BUN 23     Creatinine 1 59 (A) 0 60 - 1 30 mg/dL Glucose 196     EXTERNAL CALCIUM 9 3     ANION GAP 12     EXTERNAL EGFR 42    Protein / creatinine ratio, urine   Result Value Ref Range    PROTEIN UA 44 5     EXT Creatinine Urine 191 0     EXTERNAL Ur Prot/Creat Ratio 0 23    Phosphorus   Result Value Ref Range    EXTERNAL PHOSPHORUS 3 1    Urinalysis with microscopic   Result Value Ref Range    EXT Spec Grav, UA (Ref:1 003-1 030) 1 023     EXT Glucose, UA (Ref: Negative) neg     Ketones, UA (Ref: Negative) neg     Blood, UA 0 03-0 19     Nitrite, UA (Ref: Negative) neg      Leukocytes, UA (Ref: Negative)      EXT pH, UA (Ref: 4 5-8 0) 5 0     EXT Protein, UA (Ref: Negative) 30-70     RBC, UA 21-50     EXTERNAL WBC, UA 3-5     EXTERNAL BACTERIA, UA few     EXTERNAL CASTS, UA 6-10    CBC   Result Value Ref Range    Hemoglobin 16 3 12 0 - 17 0 g/dL    Hematocrit 47 2 36 5 - 49 3 %    WBC 8 60 Thousand/uL    Platelets 560 818 - 415 Thousands/uL   PTH, intact   Result Value Ref Range    EXTERNAL PTH 57 8        ASSESSMENT and PLAN:      Stage III chronic kidney disease  His kidney function is stable at GFR of 42  Likely etiology is diabetic nephropathy  He is aware of it  Advised hydration and avoidance of nephrotoxic medicine    DM (diabetes mellitus) ([facility] [address] )  Lab Results   Component Value Date    HGBA1C 11 0 (A) 04/12/2018       No results for input(s): POCGLU in the last 72 hours  Blood Sugar Average: Last 72 hrs:       Not very well controlled being monitored by endocrinologist   Importance of diabetic control and kidney disease also discussed with him    Hypertension  Quite stable      I will see him back in 6 month        Portions of the record may have been created with voice recognition software  Occasional wrong word or "sound a like" substitutions may have occurred due to the inherent limitations of voice recognition software  Read the chart carefully and recognize, using context, where substitutions have occurred  If you have any questions, please contact the dictating provider

## 2018-10-04 NOTE — PROGRESS NOTES
NEPHROLOGY OFFICE FOLLOW UP  Pia Matias 75 y o  male MRN: [id number]    Encounter: [id number] 10/4/2018    REASON FOR VISIT: Tristan Serna is a 75 y o  male who is here on 10/4/2018 for Follow-up; Diabetes Type 2; and Chronic Kidney Disease    HPI:    Dang Thorpe came in today for follow-up of CKD stage 3 because of diabetes  He is overall doing well  Denies any complaint frustrated with uncontrolled glucose  No other complaint        REVIEW OF SYSTEMS:    Review of Systems   Constitutional: Negative for activity change and fatigue  HENT: Negative for congestion, ear discharge, sinus pain and sinus pressure  Eyes: Negative for photophobia and pain  Respiratory: Negative for apnea, cough, choking, chest tightness and shortness of breath  Cardiovascular: Negative for chest pain, palpitations and leg swelling  Gastrointestinal: Negative for abdominal distention, abdominal pain, blood in stool and constipation  Endocrine: Negative for heat intolerance, polyphagia and polyuria  Genitourinary: Negative for difficulty urinating, flank pain and urgency  Musculoskeletal: Positive for arthralgias and back pain  Negative for neck pain and neck stiffness  Skin: Negative for color change and wound  Allergic/Immunologic: Negative for food allergies and immunocompromised state  Neurological: Negative for seizures and facial asymmetry  Hematological: Negative for adenopathy  Does not bruise/bleed easily  Psychiatric/Behavioral: Negative for self-injury and suicidal ideas           PAST MEDICAL HISTORY:  Past Medical History:   Diagnosis Date    Chronic kidney disease     Coronary artery disease     Diabetes mellitus ([facility] [address] )     High cholesterol     Hypertension        PAST SURGICAL HISTORY:  Past Surgical History:   Procedure Laterality Date    CARDIAC SURGERY      CORONARY ARTERY BYPASS GRAFT      GALLBLADDER SURGERY      SHOULDER SURGERY      VESICOSTOMY         SOCIAL HISTORY:  History   Alcohol Use No     History   Drug use: Unknown     History   Smoking Status    Former Smoker   Smokeless Tobacco    Never Used       FAMILY HISTORY:  Family History   Problem Relation Age of Onset    Hypertension Mother     Diabetes Mother     Hypertension Father     Heart disease Father        MEDICATIONS:    Current Outpatient Prescriptions:     allopurinol (ZYLOPRIM) 300 mg tablet, Take 1 tablet by mouth daily, Disp: , Rfl:     APPLE CIDER VINEGAR PO, Take 350 mg by mouth 2 (two) times a day, Disp: , Rfl:     aspirin 325 mg tablet, Take 1 tablet by mouth 2 (two) times a day, Disp: , Rfl:     atorvastatin (LIPITOR) 20 mg tablet, Take 20 mg by mouth daily, Disp: , Rfl: 3    dicyclomine (BENTYL) 20 mg tablet, Take 1 tablet by mouth daily, Disp: , Rfl:     fenofibrate (TRICOR) 48 mg tablet, Take 48 mg by mouth daily, Disp: , Rfl: 3    glimepiride (AMARYL) 1 mg tablet, Take 1 tablet by mouth 3 (three) times a day, Disp: , Rfl:     isosorbide mononitrate (ISMO,MONOKET) 10 MG tablet, Take 1 tablet by mouth daily, Disp: , Rfl:     levocetirizine (XYZAL) 5 MG tablet, Take 5 mg by mouth, Disp: , Rfl:     levothyroxine 25 mcg tablet, Take 25 mcg by mouth daily, Disp: , Rfl: 3    lisinopril (ZESTRIL) 5 mg tablet, Take 1 tablet by mouth daily, Disp: , Rfl:     losartan (COZAAR) 25 mg tablet, Take 25 mg by mouth daily, Disp: , Rfl: 3    metoprolol tartrate (LOPRESSOR) 50 mg tablet, Take 1 tablet by mouth 2 (two) times a day, Disp: , Rfl:     Omega-3 Fatty Acids (FISH OIL) 1,000 mg, Take 6 capsules by mouth daily, Disp: , Rfl:     pyridoxine (VITAMIN B6) 100 mg tablet, Take 1 tablet by mouth daily, Disp: , Rfl:     ranitidine (ZANTAC) 150 MG capsule, Take 1 capsule by mouth daily, Disp: , Rfl:     repaglinide (PRANDIN) 1 mg tablet, TAKE 1 TABLET BY MOUTH 3 TIMES A DAY (BEFORE MEALS), Disp: , Rfl: 5    sitaGLIPtin (JANUVIA) 50 mg tablet, Take by mouth, Disp: , Rfl:     PHYSICAL EXAM:  Vitals:    10/04/18 1012   BP: 120/64   BP Location: Right arm   Patient Position: Sitting   Pulse: 80   Resp: 16   Temp: (!) 96 6 °F (35 9 °C)   TempSrc: Tympanic   Weight: 90 7 kg (200 lb)   Height: 6' (1 829 m)     Body mass index is 27 12 kg/m²  Physical Exam   Constitutional: He is oriented to person, place, and time  He appears well-developed  No distress  HENT:   Head: Normocephalic and atraumatic  Mouth/Throat: Oropharynx is clear and moist    Eyes: Pupils are equal, round, and reactive to light  Conjunctivae are normal  No scleral icterus  Neck: Normal range of motion  Neck supple  No JVD present  Cardiovascular: Normal rate, regular rhythm and normal heart sounds  No murmur heard  Pulmonary/Chest: Effort normal and breath sounds normal  No respiratory distress  He has no wheezes  Abdominal: Soft  Bowel sounds are normal  He exhibits no distension and no mass  There is no tenderness  Musculoskeletal: Normal range of motion  He exhibits no edema  Neurological: He is alert and oriented to person, place, and time  Skin: Skin is warm  No rash noted  Psychiatric: He has a normal mood and affect   His behavior is normal        LAB RESULTS:  Results for orders placed or performed in visit on 71/63/71   Basic metabolic panel   Result Value Ref Range    SODIUM 138     POTASSIUM 4 6     CHLORIDE 102     CO2 24     BUN 23     Creatinine 1 59 (A) 0 60 - 1 30 mg/dL    Glucose 196     EXTERNAL CALCIUM 9 3     ANION GAP 12     EXTERNAL EGFR 42    Protein / creatinine ratio, urine   Result Value Ref Range    PROTEIN UA 44 5     EXT Creatinine Urine 191 0     EXTERNAL Ur Prot/Creat Ratio 0 23    Phosphorus   Result Value Ref Range    EXTERNAL PHOSPHORUS 3 1    Urinalysis with microscopic   Result Value Ref Range    EXT Spec Grav, UA (Ref:1 003-1 030) 1 023     EXT Glucose, UA (Ref: Negative) neg     Ketones, UA (Ref: Negative) neg     Blood, UA 0 03-0 19     Nitrite, UA (Ref: Negative) neg Leukocytes, UA (Ref: Negative)      EXT pH, UA (Ref: 4 5-8 0) 5 0     EXT Protein, UA (Ref: Negative) 30-70     RBC, UA 21-50     EXTERNAL WBC, UA 3-5     EXTERNAL BACTERIA, UA few     EXTERNAL CASTS, UA 6-10    CBC   Result Value Ref Range    Hemoglobin 16 3 12 0 - 17 0 g/dL    Hematocrit 47 2 36 5 - 49 3 %    WBC 8 60 Thousand/uL    Platelets 257 812 - 505 Thousands/uL   PTH, intact   Result Value Ref Range    EXTERNAL PTH 57 8        ASSESSMENT and PLAN:      Stage III chronic kidney disease  His kidney function is stable at GFR of 42  Likely etiology is diabetic nephropathy  He is aware of it  Advised hydration and avoidance of nephrotoxic medicine    DM (diabetes mellitus) ([facility] [address] )  Lab Results   Component Value Date    HGBA1C 11 0 (A) 04/12/2018       No results for input(s): POCGLU in the last 72 hours  Blood Sugar Average: Last 72 hrs:       Not very well controlled being monitored by endocrinologist   Importance of diabetic control and kidney disease also discussed with him    Hypertension  Quite stable      I will see him back in 6 month        Portions of the record may have been created with voice recognition software  Occasional wrong word or "sound a like" substitutions may have occurred due to the inherent limitations of voice recognition software  Read the chart carefully and recognize, using context, where substitutions have occurred  If you have any questions, please contact the dictating provider

## 2019-03-28 LAB
CREAT ?TM UR-SCNC: 117 UMOL/L
EXT PROTEIN URINE: 16.5
PROT/CREAT UR: 0.14 MG/G{CREAT}

## 2019-04-03 LAB
CREAT ?TM UR-SCNC: 117 UMOL/L
EXT DIFF-ABS BASOPHILS: 0.1
EXT DIFF-ABS EOSINOPHILS: 0.4
EXT DIFF-ABS LYMPHOCYTES: 1.5
EXT DIFF-ABS MONOCYTES: 0.6
EXT DIFF-ABS NEUTROPHILS: 4.8
EXT GLUCOSE BLD: 206
EXT PROTEIN URINE: 16.5
EXTERNAL ANION GAP: 7
EXTERNAL BUN: 24
EXTERNAL CALCIUM: 9.8
EXTERNAL CHLORIDE: 102
EXTERNAL CO2: 27
EXTERNAL CREATININE: 1.89
EXTERNAL EGFR: 34
EXTERNAL HEMATOCRIT: 51 %
EXTERNAL HEMOGLOBIN: 17.3 G/DL
EXTERNAL MCV: 84
EXTERNAL PHOSPHORUS: 3.7
EXTERNAL PLATELET COUNT: 225 K/ΜL
EXTERNAL POTASSIUM: 4.4
EXTERNAL PTH: 68.1
EXTERNAL RBC: 5.98
EXTERNAL RDW: 13.9
EXTERNAL SODIUM: 136
EXTERNAL WBC: 7.3
PROT/CREAT UR: 0.14 MG/G{CREAT}

## 2019-04-04 ENCOUNTER — OFFICE VISIT (OUTPATIENT)
Dept: NEPHROLOGY | Facility: CLINIC | Age: 74
End: 2019-04-04
Payer: MEDICARE

## 2019-04-04 VITALS
WEIGHT: 194 LBS | DIASTOLIC BLOOD PRESSURE: 70 MMHG | HEART RATE: 78 BPM | SYSTOLIC BLOOD PRESSURE: 110 MMHG | BODY MASS INDEX: 26.28 KG/M2 | TEMPERATURE: 97.3 F | RESPIRATION RATE: 16 BRPM | HEIGHT: 72 IN

## 2019-04-04 DIAGNOSIS — I10 ESSENTIAL HYPERTENSION: ICD-10-CM

## 2019-04-04 DIAGNOSIS — I25.10 CORONARY ARTERY DISEASE INVOLVING NATIVE CORONARY ARTERY OF NATIVE HEART WITHOUT ANGINA PECTORIS: ICD-10-CM

## 2019-04-04 DIAGNOSIS — E11.21 TYPE 2 DIABETES MELLITUS WITH DIABETIC NEPHROPATHY, WITHOUT LONG-TERM CURRENT USE OF INSULIN (HCC): ICD-10-CM

## 2019-04-04 DIAGNOSIS — N18.30 STAGE III CHRONIC KIDNEY DISEASE (HCC): Primary | ICD-10-CM

## 2019-04-04 LAB
EXT BLOOD, UA: NORMAL
EXT GLUCOSE, UA: NORMAL
EXT KETONES: NORMAL
EXT NITRITE, UA: NORMAL
EXT PH, UA: 5
EXT PROTEIN, UA: NORMAL
EXT SPECIFIC GRAVITY, UA: 1.03
WBC # BLD EST: NORMAL 10*3/UL

## 2019-04-04 PROCEDURE — 99213 OFFICE O/P EST LOW 20 MIN: CPT | Performed by: INTERNAL MEDICINE

## 2019-09-09 ENCOUNTER — TELEPHONE (OUTPATIENT)
Dept: NEPHROLOGY | Facility: CLINIC | Age: 74
End: 2019-09-09

## 2019-09-09 DIAGNOSIS — N18.30 CKD (CHRONIC KIDNEY DISEASE) STAGE 3, GFR 30-59 ML/MIN (HCC): Primary | ICD-10-CM

## 2019-09-09 NOTE — TELEPHONE ENCOUNTER
----- Message from Kym Henning MD sent at 9/9/2019 10:40 AM EDT -----  Done  I printed out  Please let him know  ----- Message -----  From: Ronen Riojas MA  Sent: 9/9/2019   9:49 AM EDT  To: Kym Henning MD    Pt has an appt w/ you next week, would you like any labwork done?

## 2019-09-13 ENCOUNTER — TELEPHONE (OUTPATIENT)
Dept: NEPHROLOGY | Facility: CLINIC | Age: 74
End: 2019-09-13

## 2019-09-13 NOTE — TELEPHONE ENCOUNTER
Discussed with the patient  His creatinine is getting worse  He does have right flank pain  Possibility of kidney stone causing obstructive uropathy is definitely there  Advised him to go to the hospital on urgent basis for further management  Patient understands and claim he will be going to [facility]note 3[facility] as all his blood work and chart is there

## 2019-09-13 NOTE — TELEPHONE ENCOUNTER
Spoke w/ Luis Manuel Cesar  He has pain left side of his back that wraps around to his stomach  He had his bw completed yesterday  He is unsure if the pain is due to his kidney or has a UTI    Will you please review his labs and call give him a call (he has an appt w/ you on Monday) [phone]

## 2019-09-16 ENCOUNTER — OFFICE VISIT (OUTPATIENT)
Dept: NEPHROLOGY | Facility: CLINIC | Age: 74
End: 2019-09-16
Payer: MEDICARE

## 2019-09-16 VITALS
HEART RATE: 78 BPM | BODY MASS INDEX: 26.6 KG/M2 | WEIGHT: 190 LBS | TEMPERATURE: 98 F | HEIGHT: 71 IN | DIASTOLIC BLOOD PRESSURE: 70 MMHG | SYSTOLIC BLOOD PRESSURE: 120 MMHG | RESPIRATION RATE: 18 BRPM

## 2019-09-16 DIAGNOSIS — E83.9 CHRONIC KIDNEY DISEASE-MINERAL AND BONE DISORDER: ICD-10-CM

## 2019-09-16 DIAGNOSIS — N18.9 CHRONIC KIDNEY DISEASE-MINERAL AND BONE DISORDER: ICD-10-CM

## 2019-09-16 DIAGNOSIS — N20.0 NEPHROLITHIASIS: ICD-10-CM

## 2019-09-16 DIAGNOSIS — E11.21 TYPE 2 DIABETES MELLITUS WITH DIABETIC NEPHROPATHY, WITHOUT LONG-TERM CURRENT USE OF INSULIN (HCC): ICD-10-CM

## 2019-09-16 DIAGNOSIS — I25.10 CORONARY ARTERY DISEASE INVOLVING NATIVE CORONARY ARTERY OF NATIVE HEART WITHOUT ANGINA PECTORIS: ICD-10-CM

## 2019-09-16 DIAGNOSIS — N17.9 AKI (ACUTE KIDNEY INJURY) (HCC): ICD-10-CM

## 2019-09-16 DIAGNOSIS — N18.30 STAGE III CHRONIC KIDNEY DISEASE (HCC): Primary | ICD-10-CM

## 2019-09-16 DIAGNOSIS — I10 ESSENTIAL HYPERTENSION: ICD-10-CM

## 2019-09-16 DIAGNOSIS — M89.9 CHRONIC KIDNEY DISEASE-MINERAL AND BONE DISORDER: ICD-10-CM

## 2019-09-16 LAB
CREAT ?TM UR-SCNC: 183 UMOL/L
EXT BLOOD, UA: NORMAL
EXT DIFF-ABS BASOPHILS: 0
EXT DIFF-ABS EOSINOPHILS: 0.1
EXT DIFF-ABS LYMPHOCYTES: 1
EXT DIFF-ABS MONOCYTES: 1.4
EXT DIFF-ABS NEUTROPHILS: 9.4
EXT GLUCOSE BLD: 135
EXT GLUCOSE, UA: NORMAL
EXT KETONES: NEGATIVE
EXT NITRITE, UA: NEGATIVE
EXT PH, UA: 5
EXT PROTEIN URINE: 60.9
EXT PROTEIN, UA: NEGATIVE
EXT SPECIFIC GRAVITY, UA: 1.02
EXTERNAL ANION GAP: 10
EXTERNAL BUN: 50
EXTERNAL CALCIUM: 9.3
EXTERNAL CHLORIDE: 98
EXTERNAL CO2: 24
EXTERNAL CREATININE: 3.57
EXTERNAL EGFR: 16
EXTERNAL HEMATOCRIT: 48 %
EXTERNAL HEMOGLOBIN: 15.9 G/DL
EXTERNAL MCV: 87
EXTERNAL PHOSPHORUS: 4.2
EXTERNAL PLATELET COUNT: 228 K/ΜL
EXTERNAL POTASSIUM: 4.4
EXTERNAL PTH: 68.2
EXTERNAL RBC (UA): NORMAL
EXTERNAL RBC: 5.58
EXTERNAL RDW: 13.9
EXTERNAL SODIUM: 132
EXTERNAL WBC (UA): NORMAL
EXTERNAL WBC: 12
PROT/CREAT UR: 0.33 MG/G{CREAT}
WBC # BLD EST: NEGATIVE 10*3/UL

## 2019-09-16 PROCEDURE — 99214 OFFICE O/P EST MOD 30 MIN: CPT | Performed by: INTERNAL MEDICINE

## 2019-09-16 NOTE — PROGRESS NOTES
NEPHROLOGY OFFICE FOLLOW UP  Myron Matias 75 y o  male MRN: [id number]    Encounter: [id number] 9/16/2019    REASON FOR VISIT: Livia Mcnair is a 75 y o  male who is here on 9/16/2019 for Follow-up and Chronic Kidney Disease    HPI:    Tristan Kurtz came in today for follow-up of stage III CKD  Since I saw him last he had a going to [facility] at my request because of acute kidney injury in abdominal pain  I had reviewed his blood work last week which shows worsening kidney function and when I call him he was complaining of abdominal discomfort suggesting possibility of kidney stone with hydronephrosis  Advised him to go to hospital which he went  He did have kidney stone with some hydronephrosis  Requiring lithotripsy and stone removal along with stent placement  Improvement in kidney function was there after stent  He still complaining of back pain and leg pain  Overall seems to feeling better with good urine output  Denies any chest pain palpitation or shortness of Breath      REVIEW OF SYSTEMS:    Review of Systems   Constitutional: Negative for activity change and fatigue  HENT: Negative for congestion and ear discharge  Eyes: Negative for photophobia and pain  Respiratory: Negative for apnea and choking  Cardiovascular: Negative for chest pain and palpitations  Gastrointestinal: Negative for abdominal distention and blood in stool  Endocrine: Negative for heat intolerance and polyphagia  Genitourinary: Negative for flank pain and urgency  Musculoskeletal: Positive for arthralgias and back pain  Negative for neck pain and neck stiffness  Skin: Negative for color change and wound  Allergic/Immunologic: Negative for food allergies and immunocompromised state  Neurological: Negative for seizures and facial asymmetry  Hematological: Negative for adenopathy  Does not bruise/bleed easily     Psychiatric/Behavioral: Negative for self-injury and suicidal ideas          PAST MEDICAL HISTORY:  Past Medical History:   Diagnosis Date    Chronic kidney disease     Coronary artery disease     Diabetes mellitus ([address] )     High cholesterol     Hypertension     Kidney stone        PAST SURGICAL HISTORY:  Past Surgical History:   Procedure Laterality Date    CARDIAC SURGERY      CORONARY ARTERY BYPASS GRAFT      GALLBLADDER SURGERY      SHOULDER SURGERY      VESICOSTOMY         SOCIAL HISTORY:  Social History     Substance and Sexual Activity   Alcohol Use No     Social History     Substance and Sexual Activity   Drug Use Not on file     Social History     Tobacco Use   Smoking Status Former Smoker   Smokeless Tobacco Never Used       FAMILY HISTORY:  Family History   Problem Relation Age of Onset    Hypertension Mother     Diabetes Mother     Hypertension Father     Heart disease Father        MEDICATIONS:    Current Outpatient Medications:     allopurinol (ZYLOPRIM) 300 mg tablet, Take 1 tablet by mouth daily, Disp: , Rfl:     APPLE CIDER VINEGAR PO, Take 350 mg by mouth 2 (two) times a day, Disp: , Rfl:     aspirin 325 mg tablet, Take 1 tablet by mouth 2 (two) times a day, Disp: , Rfl:     atorvastatin (LIPITOR) 20 mg tablet, Take 20 mg by mouth daily, Disp: , Rfl: 3    dicyclomine (BENTYL) 20 mg tablet, Take 1 tablet by mouth daily, Disp: , Rfl:     Empagliflozin (JARDIANCE) 10 MG TABS, Take 10 mg by mouth every morning, Disp: , Rfl:     fenofibrate (TRICOR) 48 mg tablet, Take 48 mg by mouth daily, Disp: , Rfl: 3    glimepiride (AMARYL) 1 mg tablet, Take 1 tablet by mouth 3 (three) times a day, Disp: , Rfl:     isosorbide mononitrate (ISMO,MONOKET) 10 MG tablet, Take 1 tablet by mouth daily, Disp: , Rfl:     levocetirizine (XYZAL) 5 MG tablet, Take 5 mg by mouth, Disp: , Rfl:     levothyroxine 25 mcg tablet, Take 25 mcg by mouth daily, Disp: , Rfl: 3    lisinopril (ZESTRIL) 5 mg tablet, Take 1 tablet by mouth daily, Disp: , Rfl:     losartan (COZAAR) 25 mg tablet, Take 25 mg by mouth daily, Disp: , Rfl: 3    metoprolol tartrate (LOPRESSOR) 50 mg tablet, Take 1 tablet by mouth 2 (two) times a day, Disp: , Rfl:     Omega-3 Fatty Acids (FISH OIL) 1,000 mg, Take 6 capsules by mouth daily, Disp: , Rfl:     pyridoxine (VITAMIN B6) 100 mg tablet, Take 1 tablet by mouth daily, Disp: , Rfl:     ranitidine (ZANTAC) 150 MG capsule, Take 1 capsule by mouth daily, Disp: , Rfl:     repaglinide (PRANDIN) 1 mg tablet, TAKE 1 TABLET BY MOUTH 3 TIMES A DAY (BEFORE MEALS), Disp: , Rfl: 5    sitaGLIPtin (JANUVIA) 50 mg tablet, Take by mouth, Disp: , Rfl:     PHYSICAL EXAM:  Vitals:    09/16/19 1114   BP: 120/70   BP Location: Right arm   Patient Position: Sitting   Pulse: 78   Resp: 18   Temp: 98 °F (36 7 °C)   TempSrc: Temporal   Weight: 86 2 kg (190 lb)   Height: 5' 11" (1 803 m)     Body mass index is 26 5 kg/m²  Physical Exam   Constitutional: He is oriented to person, place, and time  He appears well-developed  No distress  HENT:   Head: Normocephalic  Mouth/Throat: Oropharynx is clear and moist    Eyes: Conjunctivae are normal  No scleral icterus  Neck: Neck supple  No JVD present  Cardiovascular: Normal rate, regular rhythm and normal heart sounds  Pulmonary/Chest: Effort normal and breath sounds normal  He has no wheezes  Abdominal: Soft  Bowel sounds are normal  There is no tenderness  Musculoskeletal: Normal range of motion  He exhibits no edema  Neurological: He is alert and oriented to person, place, and time  Skin: Skin is warm  No rash noted  Psychiatric: He has a normal mood and affect   His behavior is normal        LAB RESULTS:  Results for orders placed or performed in visit on 56/11/75   Basic metabolic panel   Result Value Ref Range    SODIUM 132     POTASSIUM 4 4     CHLORIDE 98     CO2 24     BUN 50     CREATININE 3 57     Glucose 135     EXTERNAL CALCIUM 9 3     ANION GAP 10     EXTERNAL EGFR 16    Protein / creatinine ratio, urine   Result Value Ref Range    PROTEIN UA 60 9     EXT Creatinine Urine 183 0     EXTERNAL Ur Prot/Creat Ratio 0 33    Phosphorus   Result Value Ref Range    EXTERNAL PHOSPHORUS 4 2    Urinalysis with microscopic   Result Value Ref Range    Spec Grav, UA (Ref:1 003-1 030) 1 024     Glucose, UA (Ref: Negative) >vo=129     Ketones, UA (Ref: Negative) negative     Blood, UA 0 20-0 99     Nitrite, UA (Ref: Negative) negative      Leukocytes, UA (Ref: Negative) negative     pH, UA (Ref: 4 5-8 0) 5 0     Protein, UA (Ref: Negative) negative     RBC, UA 3-5     EXTERNAL WBC, UA 3-5    CBC and differential   Result Value Ref Range    WBC 12 0     External RBC 5 58     External Hemoglobin 15 9 g/dL    Hematocrit 48 %    MCV 87     External RDW 13 9     External Platelets 183 K/µL    Abs Neutrophils 9 4     Abs Lymphocytes 1 0     External Abs Monocytes  1 4     External Abs Eosinophils 0 1     External Abs Basophils  0 0    PTH, intact   Result Value Ref Range    PTH 68 2        ASSESSMENT and PLAN:      MARYCHUY (acute kidney injury) ([facility] [address] )  Baseline creatinine is about 1 8 it was all the way to 3 5 and what message I got from [facility] it was coming down and on discharge was 2 5  I will continue to monitor it and repeat blood work sometime this week  Likely etiology of worsening renal function is obstructive uropathy due to kidney stone    Nephrolithiasis  Patient had a kidney stone  He does have kidney stone history in the past usually uric acid stone according to him  I do not know chemical analysis of present stone  Advised him discussed with urologist and let me know  Advised continue hydration at this point    Stage III chronic kidney disease  Patient baseline creatinine is about 1 8  I will repeat blood test in 1 month and follow-up after that    Chronic kidney disease-mineral and bone disorder  Patient PTH is 60 with normal phosphorus    Will continue to monitor as part of the CKD management        Everything discussed at length with him and his wife  I will see him back in 1 month  He will get blood test in week and in 4 weeks  Portions of the record may have been created with voice recognition software  Occasional wrong word or "sound a like" substitutions may have occurred due to the inherent limitations of voice recognition software  Read the chart carefully and recognize, using context, where substitutions have occurred  If you have any questions, please contact the dictating provider

## 2019-09-16 NOTE — PATIENT INSTRUCTIONS
Chronic Kidney Disease   AMBULATORY CARE:   Chronic kidney disease (CKD)  is the gradual and permanent loss of kidney function  Normally, the kidneys remove fluid, chemicals, and waste from your blood  These wastes are turned into urine by your kidneys  CKD may worsen over time and lead to kidney failure  Common symptoms include the following:   · Changes in how often you need to urinate    · Swelling in your arms, legs, or feet    · Shortness of breath    · Fatigue or weakness    · Bad or bitter taste in your mouth    · Nausea, vomiting, or loss of appetite  Seek care immediately if:   · You are confused and very drowsy  · You have a seizure  · You have shortness of breath  Contact your healthcare provider if:   · You suddenly gain or lose more weight than your healthcare provider has told you is okay  · You have itchy skin or a rash  · You urinate more or less than you normally do  · You have blood in your urine  · You have nausea and repeated vomiting  · You have fatigue or muscle weakness  · You have hiccups that will not stop  · You have questions or concerns about your condition or care  Treatment for CKD:  Medicines may be given to decrease blood pressure and get rid of extra fluid  You may also receive medicine to manage health conditions that may occur with CKD  Dialysis is a treatment to remove chemicals and waste from your blood when your kidneys can no longer do this  Surgery may be needed to create an arteriovenous fistula (AVF) in your arm or insert a catheter into your abdomen so that you can receive dialysis  A kidney transplant may be done if your CKD becomes severe  Manage CKD:   · Maintain a healthy weight  Ask your healthcare provider how much you should weigh  Ask him to help you create a weight loss plan if you are overweight  · Exercise 30 to 60 minutes a day, 4 to 7 times a week, or as directed  Ask about the best exercise plan for you   Regular exercise can help you manage CKD, high blood pressure, and diabetes  · Follow your healthcare provider's advice about what to eat and drink  He may tell you to eat food low in sodium (salt), potassium, phosphorus, or protein  You may need to see a dietitian if you need help planning meals  Ask how much liquid to drink each day and which liquids are best for you  · Limit alcohol  Ask how much alcohol is safe for you to drink  A drink of alcohol is 12 ounces of beer, 5 ounces of wine, or 1½ ounces of liquor  · Do not smoke  Nicotine and other chemicals in cigarettes and cigars can cause lung and kidney damage  Ask your healthcare provider for information if you currently smoke and need help to quit  E-cigarettes or smokeless tobacco still contain nicotine  Talk to your healthcare provider before you use these products  · Ask your healthcare provider if you need vaccines  Infections such as pneumonia, influenza, and hepatitis can be more harmful or more likely to occur in a person who has CKD  Vaccines reduce your risk of infection with these viruses  Follow up with your healthcare provider as directed:  Write down your questions so you remember to ask them during your visits  © 2017 [employer] Information is for End User's use only and may not be sold, redistributed or otherwise used for commercial purposes  All illustrations and images included in CareNotes® are the copyrighted property of [employer]  or [employer]  The above information is an  only  It is not intended as medical advice for individual conditions or treatments  Talk to your doctor, nurse or pharmacist before following any medical regimen to see if it is safe and effective for you

## 2019-09-16 NOTE — ASSESSMENT & PLAN NOTE
Patient baseline creatinine is about 1 8    I will repeat blood test in 1 month and follow-up after that

## 2019-09-16 NOTE — ASSESSMENT & PLAN NOTE
Patient had a kidney stone  He does have kidney stone history in the past usually uric acid stone according to him  I do not know chemical analysis of present stone  Advised him discussed with urologist and let me know    Advised continue hydration at this point

## 2019-09-16 NOTE — ASSESSMENT & PLAN NOTE
Baseline creatinine is about 1 8 it was all the way to 3 5 and what message I got from [facility] it was coming down and on discharge was 2 5  I will continue to monitor it and repeat blood work sometime this week      Likely etiology of worsening renal function is obstructive uropathy due to kidney stone

## 2019-09-26 LAB
EXT GLUCOSE BLD: 123
EXTERNAL ANION GAP: 9
EXTERNAL BUN: 35
EXTERNAL CALCIUM: 9.9
EXTERNAL CHLORIDE: 103
EXTERNAL CO2: 27
EXTERNAL CREATININE: 1.96
EXTERNAL EGFR: 32
EXTERNAL POTASSIUM: 5
EXTERNAL SODIUM: 139

## 2019-10-14 ENCOUNTER — OFFICE VISIT (OUTPATIENT)
Dept: NEPHROLOGY | Facility: CLINIC | Age: 74
End: 2019-10-14
Payer: MEDICARE

## 2019-10-14 VITALS
SYSTOLIC BLOOD PRESSURE: 120 MMHG | DIASTOLIC BLOOD PRESSURE: 70 MMHG | TEMPERATURE: 97.4 F | HEART RATE: 68 BPM | RESPIRATION RATE: 16 BRPM | BODY MASS INDEX: 25.6 KG/M2 | WEIGHT: 189 LBS | HEIGHT: 72 IN

## 2019-10-14 DIAGNOSIS — E83.9 CHRONIC KIDNEY DISEASE-MINERAL AND BONE DISORDER: ICD-10-CM

## 2019-10-14 DIAGNOSIS — N18.30 STAGE III CHRONIC KIDNEY DISEASE (HCC): ICD-10-CM

## 2019-10-14 DIAGNOSIS — N18.9 CHRONIC KIDNEY DISEASE-MINERAL AND BONE DISORDER: ICD-10-CM

## 2019-10-14 DIAGNOSIS — I25.10 CORONARY ARTERY DISEASE INVOLVING NATIVE CORONARY ARTERY OF NATIVE HEART WITHOUT ANGINA PECTORIS: ICD-10-CM

## 2019-10-14 DIAGNOSIS — N17.9 AKI (ACUTE KIDNEY INJURY) (HCC): ICD-10-CM

## 2019-10-14 DIAGNOSIS — M89.9 CHRONIC KIDNEY DISEASE-MINERAL AND BONE DISORDER: ICD-10-CM

## 2019-10-14 DIAGNOSIS — N18.30 CKD (CHRONIC KIDNEY DISEASE) STAGE 3, GFR 30-59 ML/MIN (HCC): ICD-10-CM

## 2019-10-14 DIAGNOSIS — N18.30 STAGE III CHRONIC KIDNEY DISEASE (HCC): Primary | ICD-10-CM

## 2019-10-14 DIAGNOSIS — N20.0 NEPHROLITHIASIS: ICD-10-CM

## 2019-10-14 DIAGNOSIS — I10 ESSENTIAL HYPERTENSION: ICD-10-CM

## 2019-10-14 LAB
CREAT ?TM UR-SCNC: 119 UMOL/L
EXT BLOOD URINE: NEGATIVE
EXT DIFF-ABS BASOPHILS: 0.1
EXT DIFF-ABS EOSINOPHILS: 0.5
EXT DIFF-ABS LYMPHOCYTES: 1.8
EXT DIFF-ABS MONOCYTES: 0.6
EXT DIFF-ABS NEUTROPHILS: 4.6
EXT GLUCOSE BLD: 130
EXT PROTEIN URINE: 25.8
EXTERNAL ANION GAP: 8
EXTERNAL BUN: 26
EXTERNAL CALCIUM: 9.2
EXTERNAL CHLORIDE: 102
EXTERNAL CO2: 26
EXTERNAL CREATININE: 2.04
EXTERNAL EGFR: 31
EXTERNAL HEMATOCRIT: 48 %
EXTERNAL HEMOGLOBIN: 15.8 G/DL
EXTERNAL MCV: 87
EXTERNAL PHOSPHORUS: 3.5
EXTERNAL PLATELET COUNT: 212 K/ΜL
EXTERNAL POTASSIUM: 4
EXTERNAL PTH: 76.6
EXTERNAL RBC: 5.58
EXTERNAL RDW: 14.7
EXTERNAL SODIUM: 136
EXTERNAL WBC: 7.5
GLUCOSE 24H UR-MRATE: NORMAL G/(24.H)
KETONES UR STRIP-MCNC: NEGATIVE MG/DL
LEUKOCYTE ESTERASE UR QL STRIP: NEGATIVE
NITRITE UR QL STRIP: NEGATIVE
PH UR STRIP.AUTO: 5 [PH] (ref 4.5–8)
PROT/CREAT UR: 0.22 MG/G{CREAT}
SP GR UR STRIP.AUTO: 1.02 (ref 1–1.03)

## 2019-10-14 PROCEDURE — 99213 OFFICE O/P EST LOW 20 MIN: CPT | Performed by: INTERNAL MEDICINE

## 2019-10-14 NOTE — ASSESSMENT & PLAN NOTE
He had acute worsening kidney function because of obstructive uropathy because of kidney stone  Treatment of kidney stone with surgery was done  He is feeling better now  Kidney function also improved with creatinine down to 2 1  Pathophysiology of kidney disease discussed with him    Advised continues hydration and will continue to monitor kidney function

## 2019-10-14 NOTE — ASSESSMENT & PLAN NOTE
PTH and phosphorus are within therapeutic range and will continue to monitor as part of the CKD management

## 2019-10-14 NOTE — PROGRESS NOTES
NEPHROLOGY OFFICE FOLLOW UP  Oracio Matias 75 y o  male MRN: [id number]    Encounter: [id number] 10/14/2019    REASON FOR VISIT: Joshua Gómez is a 75 y o  male who is here on 10/14/2019 for Follow-up and Chronic Kidney Disease    HPI:    Fatou Guadarrama came in today for follow-up of CKD and acute kidney injury  He is feeling quite well  Denies any complaint    No chest pain no palpitation or shortness of Breath      REVIEW OF SYSTEMS:    Review of Systems   Constitutional: Negative for activity change and fatigue  HENT: Negative for congestion and ear discharge  Eyes: Negative for photophobia and pain  Respiratory: Negative for apnea and choking  Cardiovascular: Negative for chest pain and palpitations  Gastrointestinal: Negative for abdominal distention and blood in stool  Endocrine: Negative for heat intolerance and polyphagia  Genitourinary: Negative for flank pain and urgency  Musculoskeletal: Positive for back pain  Negative for neck pain and neck stiffness  Skin: Negative for color change and wound  Allergic/Immunologic: Negative for food allergies and immunocompromised state  Neurological: Negative for seizures and facial asymmetry  Hematological: Negative for adenopathy  Does not bruise/bleed easily  Psychiatric/Behavioral: Negative for self-injury and suicidal ideas           PAST MEDICAL HISTORY:  Past Medical History:   Diagnosis Date    Chronic kidney disease     Coronary artery disease     Diabetes mellitus ([address] )     High cholesterol     Hypertension     Kidney stone        PAST SURGICAL HISTORY:  Past Surgical History:   Procedure Laterality Date    CARDIAC SURGERY      CORONARY ARTERY BYPASS GRAFT      GALLBLADDER SURGERY      SHOULDER SURGERY      VESICOSTOMY         SOCIAL HISTORY:  Social History     Substance and Sexual Activity   Alcohol Use No     Social History     Substance and Sexual Activity   Drug Use Not on file     Social History     Tobacco Use Smoking Status Former Smoker   Smokeless Tobacco Never Used       FAMILY HISTORY:  Family History   Problem Relation Age of Onset    Hypertension Mother     Diabetes Mother     Hypertension Father     Heart disease Father        MEDICATIONS:    Current Outpatient Medications:     allopurinol (ZYLOPRIM) 300 mg tablet, Take 1 tablet by mouth daily, Disp: , Rfl:     APPLE CIDER VINEGAR PO, Take 350 mg by mouth 2 (two) times a day, Disp: , Rfl:     aspirin 325 mg tablet, Take 1 tablet by mouth 2 (two) times a day, Disp: , Rfl:     atorvastatin (LIPITOR) 20 mg tablet, Take 20 mg by mouth daily, Disp: , Rfl: 3    dicyclomine (BENTYL) 20 mg tablet, Take 1 tablet by mouth daily, Disp: , Rfl:     Empagliflozin (JARDIANCE) 10 MG TABS, Take 10 mg by mouth every morning, Disp: , Rfl:     fenofibrate (TRICOR) 48 mg tablet, Take 48 mg by mouth daily, Disp: , Rfl: 3    glimepiride (AMARYL) 1 mg tablet, Take 1 tablet by mouth 3 (three) times a day, Disp: , Rfl:     isosorbide mononitrate (ISMO,MONOKET) 10 MG tablet, Take 1 tablet by mouth daily, Disp: , Rfl:     levocetirizine (XYZAL) 5 MG tablet, Take 5 mg by mouth, Disp: , Rfl:     levothyroxine 25 mcg tablet, Take 25 mcg by mouth daily, Disp: , Rfl: 3    lisinopril (ZESTRIL) 5 mg tablet, Take 1 tablet by mouth daily, Disp: , Rfl:     losartan (COZAAR) 25 mg tablet, Take 25 mg by mouth daily, Disp: , Rfl: 3    metoprolol tartrate (LOPRESSOR) 50 mg tablet, Take 1 tablet by mouth 2 (two) times a day, Disp: , Rfl:     Omega-3 Fatty Acids (FISH OIL) 1,000 mg, Take 6 capsules by mouth daily, Disp: , Rfl:     pyridoxine (VITAMIN B6) 100 mg tablet, Take 1 tablet by mouth daily, Disp: , Rfl:     ranitidine (ZANTAC) 150 MG capsule, Take 1 capsule by mouth daily, Disp: , Rfl:     repaglinide (PRANDIN) 1 mg tablet, TAKE 1 TABLET BY MOUTH 3 TIMES A DAY (BEFORE MEALS), Disp: , Rfl: 5    sitaGLIPtin (JANUVIA) 50 mg tablet, Take by mouth, Disp: , Rfl:     PHYSICAL EXAM:  Vitals:    10/14/19 0956   BP: 120/70   BP Location: Right arm   Patient Position: Sitting   Pulse: 68   Resp: 16   Temp: (!) 97 4 °F (36 3 °C)   TempSrc: Tympanic   Weight: 85 7 kg (189 lb)   Height: 5' 11 5" (1 816 m)     Body mass index is 25 99 kg/m²  Physical Exam   Constitutional: He is oriented to person, place, and time  He appears well-developed  No distress  HENT:   Head: Normocephalic  Mouth/Throat: Oropharynx is clear and moist    Eyes: Conjunctivae are normal  No scleral icterus  Neck: Normal range of motion  Neck supple  No JVD present  Cardiovascular: Normal rate, regular rhythm and normal heart sounds  Pulmonary/Chest: Effort normal  He has no wheezes  Abdominal: Soft  Bowel sounds are normal  There is no tenderness  Musculoskeletal: Normal range of motion  He exhibits no edema  Neurological: He is alert and oriented to person, place, and time  Skin: Skin is warm  No rash noted  Psychiatric: He has a normal mood and affect  His behavior is normal        LAB RESULTS:  Results for orders placed or performed in visit on 80/54/35   Basic metabolic panel   Result Value Ref Range    SODIUM 139     POTASSIUM 5 0     CHLORIDE 103     CO2 27     BUN 35     CREATININE 1 96     Glucose 123     EXTERNAL CALCIUM 9 9     ANION GAP 9     EXTERNAL EGFR 32        ASSESSMENT and PLAN:      Stage III chronic kidney disease  He had acute worsening kidney function because of obstructive uropathy because of kidney stone  Treatment of kidney stone with surgery was done  He is feeling better now  Kidney function also improved with creatinine down to 2 1  Pathophysiology of kidney disease discussed with him    Advised continues hydration and will continue to monitor kidney function    Nephrolithiasis  Patient has seen urologist and had lithotripsy done and doing better now    Hypertension  Very well controlled with present medication    CAD (coronary artery disease)  Quite asymptomatic    Chronic kidney disease-mineral and bone disorder  PTH and phosphorus are within therapeutic range and will continue to monitor as part of the CKD management        I will see him back in 3 months  Will get blood and urine test before that visit  Portions of the record may have been created with voice recognition software  Occasional wrong word or "sound a like" substitutions may have occurred due to the inherent limitations of voice recognition software  Read the chart carefully and recognize, using context, where substitutions have occurred  If you have any questions, please contact the dictating provider

## 2019-10-14 NOTE — PATIENT INSTRUCTIONS
Chronic Kidney Disease   AMBULATORY CARE:   Chronic kidney disease (CKD)  is the gradual and permanent loss of kidney function  Normally, the kidneys remove fluid, chemicals, and waste from your blood  These wastes are turned into urine by your kidneys  CKD may worsen over time and lead to kidney failure  Common symptoms include the following:   · Changes in how often you need to urinate    · Swelling in your arms, legs, or feet    · Shortness of breath    · Fatigue or weakness    · Bad or bitter taste in your mouth    · Nausea, vomiting, or loss of appetite  Seek care immediately if:   · You are confused and very drowsy  · You have a seizure  · You have shortness of breath  Contact your healthcare provider if:   · You suddenly gain or lose more weight than your healthcare provider has told you is okay  · You have itchy skin or a rash  · You urinate more or less than you normally do  · You have blood in your urine  · You have nausea and repeated vomiting  · You have fatigue or muscle weakness  · You have hiccups that will not stop  · You have questions or concerns about your condition or care  Treatment for CKD:  Medicines may be given to decrease blood pressure and get rid of extra fluid  You may also receive medicine to manage health conditions that may occur with CKD  Dialysis is a treatment to remove chemicals and waste from your blood when your kidneys can no longer do this  Surgery may be needed to create an arteriovenous fistula (AVF) in your arm or insert a catheter into your abdomen so that you can receive dialysis  A kidney transplant may be done if your CKD becomes severe  Manage CKD:   · Maintain a healthy weight  Ask your healthcare provider how much you should weigh  Ask him to help you create a weight loss plan if you are overweight  · Exercise 30 to 60 minutes a day, 4 to 7 times a week, or as directed  Ask about the best exercise plan for you   Regular exercise can help you manage CKD, high blood pressure, and diabetes  · Follow your healthcare provider's advice about what to eat and drink  He may tell you to eat food low in sodium (salt), potassium, phosphorus, or protein  You may need to see a dietitian if you need help planning meals  Ask how much liquid to drink each day and which liquids are best for you  · Limit alcohol  Ask how much alcohol is safe for you to drink  A drink of alcohol is 12 ounces of beer, 5 ounces of wine, or 1½ ounces of liquor  · Do not smoke  Nicotine and other chemicals in cigarettes and cigars can cause lung and kidney damage  Ask your healthcare provider for information if you currently smoke and need help to quit  E-cigarettes or smokeless tobacco still contain nicotine  Talk to your healthcare provider before you use these products  · Ask your healthcare provider if you need vaccines  Infections such as pneumonia, influenza, and hepatitis can be more harmful or more likely to occur in a person who has CKD  Vaccines reduce your risk of infection with these viruses  Follow up with your healthcare provider as directed:  Write down your questions so you remember to ask them during your visits  © 2017 [employer] [employer] Information is for End User's use only and may not be sold, redistributed or otherwise used for commercial purposes  All illustrations and images included in CareNotes® are the copyrighted property of [employer]  or [employer]  The above information is an  only  It is not intended as medical advice for individual conditions or treatments  Talk to your doctor, nurse or pharmacist before following any medical regimen to see if it is safe and effective for you

## 2020-02-10 LAB
CREAT ?TM UR-SCNC: 199 UMOL/L
EXT PROTEIN URINE: 28.9
PROT/CREAT UR: 0.15 MG/G{CREAT}

## 2020-02-13 ENCOUNTER — DOCUMENTATION (OUTPATIENT)
Dept: NEPHROLOGY | Facility: CLINIC | Age: 75
End: 2020-02-13

## 2020-02-13 LAB
CREAT ?TM UR-SCNC: 199 UMOL/L
EXT BLOOD, UA: NEGATIVE
EXT DIFF-ABS BASOPHILS: 0
EXT DIFF-ABS EOSINOPHILS: 0.4
EXT DIFF-ABS LYMPHOCYTES: 1.7
EXT DIFF-ABS MONOCYTES: 0.6
EXT DIFF-ABS NEUTROPHILS: 3.4
EXT GLUCOSE BLD: 136
EXT GLUCOSE, UA: NEGATIVE
EXT KETONES: NEGATIVE
EXT NITRITE, UA: NEGATIVE
EXT PH, UA: 5
EXT PROTEIN URINE: 28.9
EXT PROTEIN, UA: NEGATIVE
EXT SPECIFIC GRAVITY, UA: 1.02
EXTERNAL ANION GAP: 7
EXTERNAL BUN: 27
EXTERNAL CALCIUM: 9.9
EXTERNAL CHLORIDE: 102
EXTERNAL CO2: 27
EXTERNAL CREATININE: 2.03
EXTERNAL EGFR: 31
EXTERNAL HEMATOCRIT: 46 %
EXTERNAL HEMOGLOBIN: 15.5 G/DL
EXTERNAL MCV: 87
EXTERNAL PHOSPHORUS: 3.6
EXTERNAL PLATELET COUNT: 190 K/ΜL
EXTERNAL POTASSIUM: 4.5
EXTERNAL PTH: 44.6
EXTERNAL RBC: 5.22
EXTERNAL RDW: 14
EXTERNAL SODIUM: 136
EXTERNAL VITAMIN D,25: 34
EXTERNAL WBC: 6.1
PROT/CREAT UR: 0.15 MG/G{CREAT}
WBC # BLD EST: NEGATIVE 10*3/UL

## 2020-02-24 ENCOUNTER — OFFICE VISIT (OUTPATIENT)
Dept: NEPHROLOGY | Facility: CLINIC | Age: 75
End: 2020-02-24
Payer: MEDICARE

## 2020-02-24 VITALS
DIASTOLIC BLOOD PRESSURE: 70 MMHG | HEIGHT: 72 IN | TEMPERATURE: 94.6 F | HEART RATE: 68 BPM | WEIGHT: 210 LBS | SYSTOLIC BLOOD PRESSURE: 110 MMHG | BODY MASS INDEX: 28.44 KG/M2 | RESPIRATION RATE: 16 BRPM

## 2020-02-24 DIAGNOSIS — M89.9 CHRONIC KIDNEY DISEASE-MINERAL AND BONE DISORDER: ICD-10-CM

## 2020-02-24 DIAGNOSIS — N18.30 STAGE III CHRONIC KIDNEY DISEASE (HCC): Primary | ICD-10-CM

## 2020-02-24 DIAGNOSIS — I10 ESSENTIAL HYPERTENSION: ICD-10-CM

## 2020-02-24 DIAGNOSIS — E83.9 CHRONIC KIDNEY DISEASE-MINERAL AND BONE DISORDER: ICD-10-CM

## 2020-02-24 DIAGNOSIS — E11.21 TYPE 2 DIABETES MELLITUS WITH DIABETIC NEPHROPATHY, WITHOUT LONG-TERM CURRENT USE OF INSULIN (HCC): ICD-10-CM

## 2020-02-24 DIAGNOSIS — N18.9 CHRONIC KIDNEY DISEASE-MINERAL AND BONE DISORDER: ICD-10-CM

## 2020-02-24 DIAGNOSIS — N20.0 NEPHROLITHIASIS: ICD-10-CM

## 2020-02-24 DIAGNOSIS — I25.10 CORONARY ARTERY DISEASE INVOLVING NATIVE CORONARY ARTERY OF NATIVE HEART WITHOUT ANGINA PECTORIS: ICD-10-CM

## 2020-02-24 PROCEDURE — 99214 OFFICE O/P EST MOD 30 MIN: CPT | Performed by: INTERNAL MEDICINE

## 2020-02-24 NOTE — ASSESSMENT & PLAN NOTE
He had recent episode of kidney stone  Likely to be calcium oxalate  Require stenting and lithotripsy    Advised hydration at this point

## 2020-02-24 NOTE — PATIENT INSTRUCTIONS
Chronic Kidney Disease   AMBULATORY CARE:   Chronic kidney disease (CKD)  is the gradual and permanent loss of kidney function  Normally, the kidneys remove fluid, chemicals, and waste from your blood  These wastes are turned into urine by your kidneys  CKD may worsen over time and lead to kidney failure  Common symptoms include the following:   · Changes in how often you need to urinate    · Swelling in your arms, legs, or feet    · Shortness of breath    · Fatigue or weakness    · Bad or bitter taste in your mouth    · Nausea, vomiting, or loss of appetite  Seek care immediately if:   · You are confused and very drowsy  · You have a seizure  · You have shortness of breath  Contact your healthcare provider if:   · You suddenly gain or lose more weight than your healthcare provider has told you is okay  · You have itchy skin or a rash  · You urinate more or less than you normally do  · You have blood in your urine  · You have nausea and repeated vomiting  · You have fatigue or muscle weakness  · You have hiccups that will not stop  · You have questions or concerns about your condition or care  Treatment for CKD:  Medicines may be given to decrease blood pressure and get rid of extra fluid  You may also receive medicine to manage health conditions that may occur with CKD  Dialysis is a treatment to remove chemicals and waste from your blood when your kidneys can no longer do this  Surgery may be needed to create an arteriovenous fistula (AVF) in your arm or insert a catheter into your abdomen so that you can receive dialysis  A kidney transplant may be done if your CKD becomes severe  Manage CKD:   · Maintain a healthy weight  Ask your healthcare provider how much you should weigh  Ask him to help you create a weight loss plan if you are overweight  · Exercise 30 to 60 minutes a day, 4 to 7 times a week, or as directed  Ask about the best exercise plan for you   Regular exercise can help you manage CKD, high blood pressure, and diabetes  · Follow your healthcare provider's advice about what to eat and drink  He may tell you to eat food low in sodium (salt), potassium, phosphorus, or protein  You may need to see a dietitian if you need help planning meals  Ask how much liquid to drink each day and which liquids are best for you  · Limit alcohol  Ask how much alcohol is safe for you to drink  A drink of alcohol is 12 ounces of beer, 5 ounces of wine, or 1½ ounces of liquor  · Do not smoke  Nicotine and other chemicals in cigarettes and cigars can cause lung and kidney damage  Ask your healthcare provider for information if you currently smoke and need help to quit  E-cigarettes or smokeless tobacco still contain nicotine  Talk to your healthcare provider before you use these products  · Ask your healthcare provider if you need vaccines  Infections such as pneumonia, influenza, and hepatitis can be more harmful or more likely to occur in a person who has CKD  Vaccines reduce your risk of infection with these viruses  Follow up with your healthcare provider as directed:  Write down your questions so you remember to ask them during your visits  © 2017 [address] Information is for End User's use only and may not be sold, redistributed or otherwise used for commercial purposes  All illustrations and images included in CareNotes® are the copyrighted property of [employer]  or [employer]  The above information is an  only  It is not intended as medical advice for individual conditions or treatments  Talk to your doctor, nurse or pharmacist before following any medical regimen to see if it is safe and effective for you

## 2020-02-24 NOTE — ASSESSMENT & PLAN NOTE
Lab Results   Component Value Date    HGBA1C 11 0 (A) 04/12/2018    I do not have new hemoglobin A1c    Importance of diabetic control and kidney disease discussed with him

## 2020-02-24 NOTE — ASSESSMENT & PLAN NOTE
Patient renal function is stable at this point with GFR of 32 with creatinine about 2  He does have diabetic nephropathy  Asymptomatic and progressive nature of kidney disease discussed with him    Advised hydration and avoidance of nephrotoxic medicine

## 2020-02-24 NOTE — ASSESSMENT & PLAN NOTE
PTH and phosphorus are within acceptable range and will continue to monitor as part of the CKD management

## 2020-02-24 NOTE — PROGRESS NOTES
NEPHROLOGY OFFICE FOLLOW UP  Cora Matias 67 y o  male MRN: [id number]    Encounter: [id number] 2/24/2020    REASON FOR VISIT: Carroll Holliday is a 67 y o  male who is here on 2/24/2020 for Chronic Kidney Disease and Follow-up    HPI:    Danna Dutton came in today for follow-up of stage III CKD  Since I saw him last he had a kidney stone which require lithotripsy followed by stent placement  He did have some quite complicated course with recurrent hematuria and abdominal pain  He claims finally passed a stone now feeling comfortable    Denies any other new complaint no chest pain no palpitation or shortness of breath      REVIEW OF SYSTEMS:    Review of Systems   Constitutional: Negative for activity change and fatigue  HENT: Negative for congestion and ear discharge  Eyes: Negative for photophobia and pain  Respiratory: Negative for apnea and choking  Cardiovascular: Negative for chest pain and palpitations  Gastrointestinal: Negative for abdominal distention and blood in stool  Endocrine: Negative for heat intolerance and polyphagia  Genitourinary: Negative for flank pain and urgency  Musculoskeletal: Negative for neck pain and neck stiffness  Skin: Negative for color change and wound  Allergic/Immunologic: Negative for food allergies and immunocompromised state  Neurological: Negative for seizures and facial asymmetry  Hematological: Negative for adenopathy  Does not bruise/bleed easily  Psychiatric/Behavioral: Negative for self-injury and suicidal ideas           PAST MEDICAL HISTORY:  Past Medical History:   Diagnosis Date    Chronic kidney disease     Coronary artery disease     Diabetes mellitus ([address] )     High cholesterol     Hypertension     Kidney stone        PAST SURGICAL HISTORY:  Past Surgical History:   Procedure Laterality Date    CARDIAC SURGERY      CORONARY ARTERY BYPASS GRAFT      GALLBLADDER SURGERY      SHOULDER SURGERY      VESICOSTOMY         SOCIAL HISTORY:  Social History     Substance and Sexual Activity   Alcohol Use No     Social History     Substance and Sexual Activity   Drug Use Not on file     Social History     Tobacco Use   Smoking Status Former Smoker   Smokeless Tobacco Never Used       FAMILY HISTORY:  Family History   Problem Relation Age of Onset    Hypertension Mother     Diabetes Mother     Hypertension Father     Heart disease Father        MEDICATIONS:    Current Outpatient Medications:     allopurinol (ZYLOPRIM) 300 mg tablet, Take 1 tablet by mouth daily, Disp: , Rfl:     APPLE CIDER VINEGAR PO, Take 350 mg by mouth 2 (two) times a day, Disp: , Rfl:     aspirin 325 mg tablet, Take 1 tablet by mouth 2 (two) times a day, Disp: , Rfl:     atorvastatin (LIPITOR) 20 mg tablet, Take 20 mg by mouth daily, Disp: , Rfl: 3    dicyclomine (BENTYL) 20 mg tablet, Take 1 tablet by mouth daily, Disp: , Rfl:     fenofibrate (TRICOR) 48 mg tablet, Take 48 mg by mouth daily, Disp: , Rfl: 3    glimepiride (AMARYL) 1 mg tablet, Take 1 tablet by mouth 3 (three) times a day, Disp: , Rfl:     isosorbide mononitrate (ISMO,MONOKET) 10 MG tablet, Take 1 tablet by mouth daily, Disp: , Rfl:     levocetirizine (XYZAL) 5 MG tablet, Take 5 mg by mouth, Disp: , Rfl:     levothyroxine 25 mcg tablet, Take 25 mcg by mouth daily, Disp: , Rfl: 3    lisinopril (ZESTRIL) 5 mg tablet, Take 1 tablet by mouth daily, Disp: , Rfl:     losartan (COZAAR) 25 mg tablet, Take 25 mg by mouth daily, Disp: , Rfl: 3    metoprolol tartrate (LOPRESSOR) 50 mg tablet, Take 1 tablet by mouth 2 (two) times a day, Disp: , Rfl:     Omega-3 Fatty Acids (FISH OIL) 1,000 mg, Take 6 capsules by mouth daily, Disp: , Rfl:     pyridoxine (VITAMIN B6) 100 mg tablet, Take 1 tablet by mouth daily, Disp: , Rfl:     ranitidine (ZANTAC) 150 MG capsule, Take 1 capsule by mouth daily, Disp: , Rfl:     repaglinide (PRANDIN) 1 mg tablet, TAKE 1 TABLET BY MOUTH 3 TIMES A DAY (BEFORE MEALS), Disp: , Rfl: 5    sitaGLIPtin (JANUVIA) 50 mg tablet, Take by mouth, Disp: , Rfl:     PHYSICAL EXAM:  Vitals:    02/24/20 0918   BP: 110/70   BP Location: Right arm   Patient Position: Sitting   Pulse: 68   Resp: 16   Temp: (!) 94 6 °F (34 8 °C)   TempSrc: Tympanic   Weight: 95 3 kg (210 lb)   Height: 6' (1 829 m)     Body mass index is 28 48 kg/m²  Physical Exam   Constitutional: He is oriented to person, place, and time  He appears well-developed  No distress  HENT:   Head: Normocephalic  Mouth/Throat: Oropharynx is clear and moist    Eyes: Conjunctivae are normal  No scleral icterus  Neck: Neck supple  No JVD present  Cardiovascular: Normal rate and normal heart sounds  Pulmonary/Chest: Effort normal  He has no wheezes  Abdominal: Soft  There is no tenderness  Musculoskeletal: Normal range of motion  He exhibits no edema  Neurological: He is alert and oriented to person, place, and time  Skin: Skin is warm  No rash noted  Psychiatric: He has a normal mood and affect   His behavior is normal        LAB RESULTS:  Results for orders placed or performed in visit on 08/51/24   Basic metabolic panel   Result Value Ref Range    SODIUM 136     POTASSIUM 4 5     CHLORIDE 102     CO2 27     BUN 27     CREATININE 2 03     Glucose 136     EXTERNAL CALCIUM 9 9     ANION GAP 7     EXTERNAL EGFR 31    Protein / creatinine ratio, urine   Result Value Ref Range    PROTEIN UA 28 9     EXT Creatinine Urine 199 0     EXTERNAL Ur Prot/Creat Ratio 0 15    Phosphorus   Result Value Ref Range    EXTERNAL PHOSPHORUS 3 6    Urinalysis with microscopic   Result Value Ref Range    Spec Grav, UA (Ref:1 003-1 030) 1 021     Glucose, UA (Ref: Negative) negative     Ketones, UA (Ref: Negative) negative     Blood, UA negative     Nitrite, UA (Ref: Negative) negative      Leukocytes, UA (Ref: Negative) negative     pH, UA (Ref: 4 5-8 0) 5 0     Protein, UA (Ref: Negative) negative    Vitamin D 25 hydroxy   Result Value Ref Range EXTERNAL VITAMIN D,25 34    CBC and differential   Result Value Ref Range    WBC 6 1     External RBC 5 22     External Hemoglobin 15 5 g/dL    Hematocrit 46 %    MCV 87     External RDW 14     External Platelets 883 K/µL    Abs Neutrophils 3 4     Abs Lymphocytes 1 7     External Abs Monocytes  0 6     External Abs Eosinophils 0 4     External Abs Basophils  0 0    PTH, intact   Result Value Ref Range    PTH 44 6        ASSESSMENT and PLAN:      Stage III chronic kidney disease  Patient renal function is stable at this point with GFR of 32 with creatinine about 2  He does have diabetic nephropathy  Asymptomatic and progressive nature of kidney disease discussed with him  Advised hydration and avoidance of nephrotoxic medicine     Chronic kidney disease-mineral and bone disorder  PTH and phosphorus are within acceptable range and will continue to monitor as part of the CKD management    Nephrolithiasis  He had recent episode of kidney stone  Likely to be calcium oxalate  Require stenting and lithotripsy  Advised hydration at this point    DM (diabetes mellitus) ([facility] [address] )    Lab Results   Component Value Date    HGBA1C 11 0 (A) 04/12/2018    I do not have new hemoglobin A1c  Importance of diabetic control and kidney disease discussed with him    Hypertension  Very well controlled with present medication which I will continue    I discussed blood work and medication with him  I will see him back in 6 months will get blood and urine test before that visit          Portions of the record may have been created with voice recognition software  Occasional wrong word or "sound a like" substitutions may have occurred due to the inherent limitations of voice recognition software  Read the chart carefully and recognize, using context, where substitutions have occurred  If you have any questions, please contact the dictating provider

## 2020-08-19 ENCOUNTER — TELEPHONE (OUTPATIENT)
Dept: NEPHROLOGY | Facility: CLINIC | Age: 75
End: 2020-08-19

## 2020-08-19 LAB
CREAT ?TM UR-SCNC: 191 UMOL/L
EXT PROTEIN URINE: 66.3
PROT/CREAT UR: 0.35 MG/G{CREAT}

## 2020-08-19 NOTE — TELEPHONE ENCOUNTER
I called and left a message on machine for patient to return our call about confirming his appointment and to remind him to have the blood work done   Gilmar Hall,

## 2020-08-19 NOTE — TELEPHONE ENCOUNTER
Patient called to confirm appt with Dr Isaac Atkins Monday 8/24   [address] [facility]

## 2020-08-20 ENCOUNTER — DOCUMENTATION (OUTPATIENT)
Dept: NEPHROLOGY | Facility: CLINIC | Age: 75
End: 2020-08-20

## 2020-08-20 LAB
CREAT ?TM UR-SCNC: 191 UMOL/L
EXT BLOOD, UA: NORMAL
EXT DIFF-ABS BASOPHILS: 0.1
EXT DIFF-ABS EOSINOPHILS: 0.4
EXT DIFF-ABS LYMPHOCYTES: 1.5
EXT DIFF-ABS MONOCYTES: 0.5
EXT DIFF-ABS NEUTROPHILS: 5.2
EXT GLUCOSE BLD: 200
EXT GLUCOSE, UA: NORMAL
EXT KETONES: NORMAL
EXT NITRITE, UA: NORMAL
EXT PH, UA: 5
EXT PROTEIN URINE: 66.3
EXT PROTEIN, UA: NORMAL
EXT SPECIFIC GRAVITY, UA: 1.02
EXTERNAL ANION GAP: 8
EXTERNAL BACTERIA (UA): NORMAL
EXTERNAL BUN: 28
EXTERNAL CALCIUM: 9.6
EXTERNAL CASTS (UA): NORMAL
EXTERNAL CHLORIDE: 103
EXTERNAL CO2: 29
EXTERNAL CREATININE: 1.89
EXTERNAL EGFR: 34
EXTERNAL HEMATOCRIT: 47 %
EXTERNAL HEMOGLOBIN: 15.5 G/DL
EXTERNAL MCV: 88
EXTERNAL PHOSPHORUS: 3.3
EXTERNAL PLATELET COUNT: 201 K/ΜL
EXTERNAL POTASSIUM: 4.3
EXTERNAL PTH: 53.3
EXTERNAL RBC (UA): NORMAL
EXTERNAL RBC: 5.28
EXTERNAL RDW: 14.1
EXTERNAL SODIUM: 140
EXTERNAL VITAMIN D,25: 30
EXTERNAL WBC (UA): NORMAL
EXTERNAL WBC: 7.6
PROT/CREAT UR: 0.35 MG/G{CREAT}
WBC # BLD EST: NORMAL 10*3/UL

## 2020-08-24 ENCOUNTER — OFFICE VISIT (OUTPATIENT)
Dept: NEPHROLOGY | Facility: CLINIC | Age: 75
End: 2020-08-24
Payer: MEDICARE

## 2020-08-24 VITALS
RESPIRATION RATE: 16 BRPM | WEIGHT: 218 LBS | TEMPERATURE: 97.3 F | HEIGHT: 72 IN | SYSTOLIC BLOOD PRESSURE: 120 MMHG | HEART RATE: 68 BPM | BODY MASS INDEX: 29.53 KG/M2 | DIASTOLIC BLOOD PRESSURE: 70 MMHG

## 2020-08-24 DIAGNOSIS — N18.9 CHRONIC KIDNEY DISEASE-MINERAL AND BONE DISORDER: ICD-10-CM

## 2020-08-24 DIAGNOSIS — N20.0 NEPHROLITHIASIS: ICD-10-CM

## 2020-08-24 DIAGNOSIS — E11.21 TYPE 2 DIABETES MELLITUS WITH DIABETIC NEPHROPATHY, WITHOUT LONG-TERM CURRENT USE OF INSULIN (HCC): ICD-10-CM

## 2020-08-24 DIAGNOSIS — M89.9 CHRONIC KIDNEY DISEASE-MINERAL AND BONE DISORDER: ICD-10-CM

## 2020-08-24 DIAGNOSIS — I25.10 CORONARY ARTERY DISEASE INVOLVING NATIVE CORONARY ARTERY OF NATIVE HEART WITHOUT ANGINA PECTORIS: ICD-10-CM

## 2020-08-24 DIAGNOSIS — N18.30 STAGE III CHRONIC KIDNEY DISEASE (HCC): Primary | ICD-10-CM

## 2020-08-24 DIAGNOSIS — E83.9 CHRONIC KIDNEY DISEASE-MINERAL AND BONE DISORDER: ICD-10-CM

## 2020-08-24 DIAGNOSIS — I10 ESSENTIAL HYPERTENSION: ICD-10-CM

## 2020-08-24 PROCEDURE — 99214 OFFICE O/P EST MOD 30 MIN: CPT | Performed by: INTERNAL MEDICINE

## 2020-08-24 NOTE — ASSESSMENT & PLAN NOTE
Patient does have calcium oxalate stone by the history  He also claims he had uric acid stone also  Will do 24 urinalysis as part of the metabolic management of the kidney stone at this point    Advised hydration

## 2020-08-24 NOTE — PATIENT INSTRUCTIONS
Chronic Kidney Disease   AMBULATORY CARE:   Chronic kidney disease (CKD)  is the gradual and permanent loss of kidney function  Normally, the kidneys remove fluid, chemicals, and waste from your blood  These wastes are turned into urine by your kidneys  CKD may worsen over time and lead to kidney failure  Common symptoms include the following:   · Changes in how often you need to urinate    · Swelling in your arms, legs, or feet    · Shortness of breath    · Fatigue or weakness    · Bad or bitter taste in your mouth    · Nausea, vomiting, or loss of appetite  Seek care immediately if:   · You are confused and very drowsy  · You have a seizure  · You have shortness of breath  Contact your healthcare provider if:   · You suddenly gain or lose more weight than your healthcare provider has told you is okay  · You have itchy skin or a rash  · You urinate more or less than you normally do  · You have blood in your urine  · You have nausea and repeated vomiting  · You have fatigue or muscle weakness  · You have hiccups that will not stop  · You have questions or concerns about your condition or care  Treatment for CKD:  Medicines may be given to decrease blood pressure and get rid of extra fluid  You may also receive medicine to manage health conditions that may occur with CKD  Dialysis is a treatment to remove chemicals and waste from your blood when your kidneys can no longer do this  Surgery may be needed to create an arteriovenous fistula (AVF) in your arm or insert a catheter into your abdomen so that you can receive dialysis  A kidney transplant may be done if your CKD becomes severe  Manage CKD:   · Maintain a healthy weight  Ask your healthcare provider how much you should weigh  Ask him to help you create a weight loss plan if you are overweight  · Exercise 30 to 60 minutes a day, 4 to 7 times a week, or as directed  Ask about the best exercise plan for you   Regular exercise can help you manage CKD, high blood pressure, and diabetes  · Follow your healthcare provider's advice about what to eat and drink  He may tell you to eat food low in sodium (salt), potassium, phosphorus, or protein  You may need to see a dietitian if you need help planning meals  Ask how much liquid to drink each day and which liquids are best for you  · Limit alcohol  Ask how much alcohol is safe for you to drink  A drink of alcohol is 12 ounces of beer, 5 ounces of wine, or 1½ ounces of liquor  · Do not smoke  Nicotine and other chemicals in cigarettes and cigars can cause lung and kidney damage  Ask your healthcare provider for information if you currently smoke and need help to quit  E-cigarettes or smokeless tobacco still contain nicotine  Talk to your healthcare provider before you use these products  · Ask your healthcare provider if you need vaccines  Infections such as pneumonia, influenza, and hepatitis can be more harmful or more likely to occur in a person who has CKD  Vaccines reduce your risk of infection with these viruses  Follow up with your healthcare provider as directed:  Write down your questions so you remember to ask them during your visits  © 2017 [employer] [employer] Information is for End User's use only and may not be sold, redistributed or otherwise used for commercial purposes  All illustrations and images included in CareNotes® are the copyrighted property of [employer]  or [employer]  The above information is an  only  It is not intended as medical advice for individual conditions or treatments  Talk to your doctor, nurse or pharmacist before following any medical regimen to see if it is safe and effective for you

## 2020-08-24 NOTE — ASSESSMENT & PLAN NOTE
PTH and phosphorus along with vitamin-D are within acceptable range and will continue to monitor as part of the CKD management

## 2020-08-24 NOTE — ASSESSMENT & PLAN NOTE
Patient renal function is stable at GFR of about thirty-four  Likely etiology is diabetic nephropathy  Asymptomatic and progressive nature of kidney disease discussed with him    Advised hydration and avoidance of any nephrotoxic medicine

## 2020-08-24 NOTE — ASSESSMENT & PLAN NOTE
Blood pressure is very well control patient is on ACE-inhibitor as well as ARB blocker    At tolerating very well without any side effect which will continue as part of the renal protection

## 2020-08-24 NOTE — ASSESSMENT & PLAN NOTE
Lab Results   Component Value Date    HGBA1C 9 0 (H) 07/13/2020    Diabetes not well control    Importance of diabetic control and kidney disease discussed with him

## 2020-08-24 NOTE — PROGRESS NOTES
NEPHROLOGY OFFICE FOLLOW UP  Rebeca Matias 67 y o  male MRN: [id number]    Encounter: [id number] 8/24/2020    REASON FOR VISIT: Kameron Rockwell is a 67 y o  male who is here on 8/24/2020 for Chronic Kidney Disease and Follow-up    HPI:    Angel Krishna came in today for follow-up of CKD and kidney stone  He is doing quite well  Does not have any more kidney stone since his last visit  He is being monitored by urologist    Overall feeling well  Because his hot weather and mask is to wear he is not very comfortable  No chest pain no palpitation no nausea no vomiting    Does have some back pain and knee pain    Patient does have recurrent kidney stone requiring lithotripsy  It is quite painful  He is drinking of water but still has some uncomfortable feeling in the flank      REVIEW OF SYSTEMS:    Review of Systems   Constitutional: Negative for activity change and fatigue  HENT: Negative for congestion and ear discharge  Eyes: Negative for photophobia and pain  Respiratory: Negative for apnea and choking  Cardiovascular: Negative for chest pain and palpitations  Gastrointestinal: Negative for abdominal distention and blood in stool  Endocrine: Negative for heat intolerance and polyphagia  Genitourinary: Negative for flank pain and urgency  Musculoskeletal: Negative for neck pain and neck stiffness  Skin: Negative for color change and wound  Allergic/Immunologic: Negative for food allergies and immunocompromised state  Neurological: Negative for seizures and facial asymmetry  Hematological: Negative for adenopathy  Does not bruise/bleed easily  Psychiatric/Behavioral: Negative for self-injury and suicidal ideas           PAST MEDICAL HISTORY:  Past Medical History:   Diagnosis Date    Chronic kidney disease     Coronary artery disease     Diabetes [facility] ([facility] [address] [facility]     High cholesterol     Hypertension     Kidney stone        PAST SURGICAL HISTORY:  Past Surgical History: Procedure Laterality Date    CARDIAC SURGERY      CORONARY ARTERY BYPASS GRAFT      GALLBLADDER SURGERY      SHOULDER SURGERY      VESICOSTOMY         SOCIAL HISTORY:  Social History     Substance and Sexual Activity   Alcohol Use No     Social History     Substance and Sexual Activity   Drug Use Not on file     Social History     Tobacco Use   Smoking Status Former Smoker   Smokeless Tobacco Never Used       FAMILY HISTORY:  Family History   Problem Relation Age of Onset    Hypertension Mother     Diabetes Mother     Hypertension Father     Heart disease Father        MEDICATIONS:    Current Outpatient Medications:     allopurinol (ZYLOPRIM) 300 mg tablet, Take 1 tablet by mouth daily, Disp: , Rfl:     APPLE CIDER VINEGAR PO, Take 350 mg by mouth 2 (two) times a day, Disp: , Rfl:     aspirin 325 mg tablet, Take 1 tablet by mouth 2 (two) times a day, Disp: , Rfl:     atorvastatin (LIPITOR) 20 mg tablet, Take 20 mg by mouth daily, Disp: , Rfl: 3    dicyclomine (BENTYL) 20 mg tablet, Take 1 tablet by mouth daily, Disp: , Rfl:     fenofibrate (TRICOR) 48 mg tablet, Take 48 mg by mouth daily, Disp: , Rfl: 3    glimepiride (AMARYL) 1 mg tablet, Take 1 tablet by mouth 3 (three) times a day, Disp: , Rfl:     isosorbide mononitrate (ISMO,MONOKET) 10 MG tablet, Take 1 tablet by mouth daily, Disp: , Rfl:     levocetirizine (XYZAL) 5 MG tablet, Take 5 mg by mouth, Disp: , Rfl:     levothyroxine 25 mcg tablet, Take 25 mcg by mouth daily, Disp: , Rfl: 3    lisinopril (ZESTRIL) 5 mg tablet, Take 1 tablet by mouth daily, Disp: , Rfl:     losartan (COZAAR) 25 mg tablet, Take 25 mg by mouth daily, Disp: , Rfl: 3    metoprolol tartrate (LOPRESSOR) 50 mg tablet, Take 1 tablet by mouth 2 (two) times a day, Disp: , Rfl:     Omega-3 Fatty Acids (FISH OIL) 1,000 mg, Take 6 capsules by mouth daily, Disp: , Rfl:     pyridoxine (VITAMIN B6) 100 mg tablet, Take 1 tablet by mouth daily, Disp: , Rfl:     ranitidine (ZANTAC) 150 MG capsule, Take 1 capsule by mouth daily, Disp: , Rfl:     repaglinide (PRANDIN) 1 mg tablet, TAKE 1 TABLET BY MOUTH 3 TIMES A DAY (BEFORE MEALS), Disp: , Rfl: 5    sitaGLIPtin (JANUVIA) 50 mg tablet, Take by mouth, Disp: , Rfl:     PHYSICAL EXAM:  Vitals:    08/24/20 0929   BP: 120/70   BP Location: Right arm   Patient Position: Sitting   Pulse: 68   Resp: 16   Temp: (!) 97 3 °F (36 3 °C)   TempSrc: Temporal   Weight: 98 9 kg (218 lb)   Height: 6' (1 829 m)     Body mass index is 29 57 kg/m²  Physical Exam  Constitutional:       General: He is not in acute distress  Appearance: He is well-developed  HENT:      Head: Normocephalic  Eyes:      General: No scleral icterus  Conjunctiva/sclera: Conjunctivae normal    Neck:      Musculoskeletal: Normal range of motion and neck supple  Vascular: No JVD  Cardiovascular:      Rate and Rhythm: Normal rate and regular rhythm  Heart sounds: Normal heart sounds  No murmur  Pulmonary:      Effort: Pulmonary effort is normal  No respiratory distress  Breath sounds: Normal breath sounds  No wheezing  Abdominal:      Palpations: Abdomen is soft  Tenderness: There is no abdominal tenderness  Musculoskeletal: Normal range of motion  Skin:     General: Skin is warm  Findings: No rash  Neurological:      Mental Status: He is alert and oriented to person, place, and time     Psychiatric:         Behavior: Behavior normal          LAB RESULTS:  Results for orders placed or performed in visit on 06/02/70   Basic metabolic panel   Result Value Ref Range    SODIUM 140     POTASSIUM 4 3     CHLORIDE 103     CO2 29     BUN 28     CREATININE 1 89     Glucose 200     EXTERNAL CALCIUM 9 6     ANION GAP 8     EXTERNAL EGFR 34    Protein / creatinine ratio, urine   Result Value Ref Range    PROTEIN UA 66 3     EXT Creatinine Urine 191 0     EXTERNAL Ur Prot/Creat Ratio 0 35    Phosphorus   Result Value Ref Range    EXTERNAL PHOSPHORUS 3 3    Urinalysis with microscopic   Result Value Ref Range    Spec Grav, UA (Ref:1 003-1 030) 1 025     Glucose, UA (Ref: Negative)      Ketones, UA (Ref: Negative) neg     Blood, UA neg     Nitrite, UA (Ref: Negative) neg      Leukocytes, UA (Ref: Negative) neg     pH, UA (Ref: 4 5-8 0) 5 0     Protein, UA (Ref: Negative) 30-70     RBC, UA 0-2     EXTERNAL WBC, UA 0-2     EXTERNAL BACTERIA, UA few     CASTS, UA 0-2    Vitamin D 25 hydroxy   Result Value Ref Range    EXTERNAL VITAMIN D,25 30    CBC and differential   Result Value Ref Range    WBC 7 6     External RBC 5 28     External Hemoglobin 15 5 g/dL    Hematocrit 47 %    MCV 88     External RDW 14 1     External Platelets 396 K/µL    Abs Neutrophils 5 2     Abs Lymphocytes 1 5     External Abs Monocytes  0 5     External Abs Eosinophils 0 4     External Abs Basophils  0 1    PTH, intact   Result Value Ref Range    PTH 53 3        ASSESSMENT and PLAN:      Stage III chronic kidney disease  Patient renal function is stable at GFR of about thirty-four  Likely etiology is diabetic nephropathy  Asymptomatic and progressive nature of kidney disease discussed with him  Advised hydration and avoidance of any nephrotoxic medicine    Chronic kidney disease-mineral and bone disorder  PTH and phosphorus along with vitamin-D are within acceptable range and will continue to monitor as part of the CKD management    Nephrolithiasis  Patient does have calcium oxalate stone by the history  He also claims he had uric acid stone also  Will do 24 urinalysis as part of the metabolic management of the kidney stone at this point  Advised hydration    CAD (coronary artery disease)  Quite asymptomatic    [facility]    Lab Results   Component Value Date    HGBA1C 9 0 (H) 07/13/2020    Diabetes not well control    Importance of diabetic control and kidney disease discussed with him    Hypertension  Blood pressure is very well control patient is on ACE-inhibitor as well as ARB blocker  At tolerating very well without any side effect which will continue as part of the renal protection    Everything discussed including blood work with him  Kidney stone management discussed with him also  Will do some metabolic workup at this point  I will see him back in 3 months  Portions of the record may have been created with voice recognition software  Occasional wrong word or "sound a like" substitutions may have occurred due to the inherent limitations of voice recognition software  Read the chart carefully and recognize, using context, where substitutions have occurred  If you have any questions, please contact the dictating provider

## 2020-09-21 ENCOUNTER — TELEPHONE (OUTPATIENT)
Dept: NEPHROLOGY | Facility: CLINIC | Age: 75
End: 2020-09-21

## 2020-09-21 NOTE — TELEPHONE ENCOUNTER
Patient stopped at the office today 9/21 to drop off results for Dr Alhaji Curry which were given to Dr Alhaji Curry and added to the patient's chart  Patient also stated that he had not yet received his kit from [address] [place] [address]  I called [phone] and spoke to Dee Dee Marin  I informed Dee Dee Marin that we had faxed the form to them on 8/24 and Dee Dee Marin stated they had never received it  I did give Dee Dee Marin all the information to place the order for the kit to be mailed tot he patient and faxed the form again to [phone] and received a fax confirmation  I made patient aware that they will be sending the kit to his address and that he should follow the instructions included in the kit  Patient understood and was ok with this   [address] [place] [address]

## 2020-11-17 ENCOUNTER — TELEPHONE (OUTPATIENT)
Dept: NEPHROLOGY | Facility: CLINIC | Age: 75
End: 2020-11-17

## 2020-11-27 ENCOUNTER — TELEPHONE (OUTPATIENT)
Dept: NEPHROLOGY | Facility: CLINIC | Age: 75
End: 2020-11-27

## 2020-11-27 LAB
CREAT ?TM UR-SCNC: 237 UMOL/L
EXT PROTEIN URINE: 56.7
PROT/CREAT UR: 0.24 MG/G{CREAT}

## 2020-12-22 ENCOUNTER — TELEPHONE (OUTPATIENT)
Dept: NEPHROLOGY | Facility: CLINIC | Age: 75
End: 2020-12-22

## 2020-12-23 ENCOUNTER — OFFICE VISIT (OUTPATIENT)
Dept: NEPHROLOGY | Facility: CLINIC | Age: 75
End: 2020-12-23
Payer: MEDICARE

## 2020-12-23 VITALS
RESPIRATION RATE: 16 BRPM | DIASTOLIC BLOOD PRESSURE: 70 MMHG | HEART RATE: 68 BPM | HEIGHT: 72 IN | WEIGHT: 215 LBS | BODY MASS INDEX: 29.12 KG/M2 | TEMPERATURE: 96.4 F | SYSTOLIC BLOOD PRESSURE: 110 MMHG

## 2020-12-23 DIAGNOSIS — E11.21 TYPE 2 DIABETES MELLITUS WITH DIABETIC NEPHROPATHY, WITHOUT LONG-TERM CURRENT USE OF INSULIN (HCC): ICD-10-CM

## 2020-12-23 DIAGNOSIS — I10 ESSENTIAL HYPERTENSION: ICD-10-CM

## 2020-12-23 DIAGNOSIS — M89.9 CHRONIC KIDNEY DISEASE-MINERAL AND BONE DISORDER: ICD-10-CM

## 2020-12-23 DIAGNOSIS — N18.9 CHRONIC KIDNEY DISEASE-MINERAL AND BONE DISORDER: ICD-10-CM

## 2020-12-23 DIAGNOSIS — N18.32 STAGE 3B CHRONIC KIDNEY DISEASE (HCC): Primary | ICD-10-CM

## 2020-12-23 DIAGNOSIS — E83.9 CHRONIC KIDNEY DISEASE-MINERAL AND BONE DISORDER: ICD-10-CM

## 2020-12-23 PROCEDURE — 99214 OFFICE O/P EST MOD 30 MIN: CPT | Performed by: INTERNAL MEDICINE

## 2021-04-19 ENCOUNTER — TELEPHONE (OUTPATIENT)
Dept: NEPHROLOGY | Facility: CLINIC | Age: 76
End: 2021-04-19